# Patient Record
Sex: FEMALE | Race: WHITE | NOT HISPANIC OR LATINO | Employment: OTHER | URBAN - METROPOLITAN AREA
[De-identification: names, ages, dates, MRNs, and addresses within clinical notes are randomized per-mention and may not be internally consistent; named-entity substitution may affect disease eponyms.]

---

## 2017-01-16 ENCOUNTER — PATIENT MESSAGE (OUTPATIENT)
Dept: NEUROLOGY | Facility: CLINIC | Age: 82
End: 2017-01-16

## 2017-01-25 ENCOUNTER — PATIENT MESSAGE (OUTPATIENT)
Dept: INTERNAL MEDICINE | Facility: CLINIC | Age: 82
End: 2017-01-25

## 2017-01-25 DIAGNOSIS — G20.C PARKINSONISM, UNSPECIFIED PARKINSONISM TYPE: ICD-10-CM

## 2017-01-25 DIAGNOSIS — G20.A1 IDIOPATHIC PARKINSON'S DISEASE: Primary | ICD-10-CM

## 2017-01-26 NOTE — TELEPHONE ENCOUNTER
"Are you aware of some "lift mechanism" you're able to write orders for? Pt is having difficulties getting out of her chair at home and they were told there was a lift mechanism her PCP could order for her. Please advise.   "

## 2017-02-07 ENCOUNTER — PATIENT MESSAGE (OUTPATIENT)
Dept: NEUROLOGY | Facility: CLINIC | Age: 82
End: 2017-02-07

## 2017-02-14 ENCOUNTER — PATIENT MESSAGE (OUTPATIENT)
Dept: INTERNAL MEDICINE | Facility: CLINIC | Age: 82
End: 2017-02-14

## 2017-02-14 ENCOUNTER — OFFICE VISIT (OUTPATIENT)
Dept: INTERNAL MEDICINE | Facility: CLINIC | Age: 82
End: 2017-02-14
Payer: MEDICARE

## 2017-02-14 VITALS
BODY MASS INDEX: 22.35 KG/M2 | SYSTOLIC BLOOD PRESSURE: 138 MMHG | WEIGHT: 130.94 LBS | DIASTOLIC BLOOD PRESSURE: 72 MMHG | HEIGHT: 64 IN | HEART RATE: 68 BPM

## 2017-02-14 DIAGNOSIS — G20.C PARKINSONISM, UNSPECIFIED PARKINSONISM TYPE: ICD-10-CM

## 2017-02-14 DIAGNOSIS — I70.0 AORTIC ATHEROSCLEROSIS: ICD-10-CM

## 2017-02-14 DIAGNOSIS — G20.A1 IDIOPATHIC PARKINSON'S DISEASE: ICD-10-CM

## 2017-02-14 DIAGNOSIS — I10 ESSENTIAL HYPERTENSION: ICD-10-CM

## 2017-02-14 DIAGNOSIS — E78.5 DYSLIPIDEMIA: ICD-10-CM

## 2017-02-14 DIAGNOSIS — Z00.00 ROUTINE PHYSICAL EXAMINATION: Primary | ICD-10-CM

## 2017-02-14 PROCEDURE — 99499 UNLISTED E&M SERVICE: CPT | Mod: S$GLB,,, | Performed by: INTERNAL MEDICINE

## 2017-02-14 PROCEDURE — 3078F DIAST BP <80 MM HG: CPT | Mod: S$GLB,,, | Performed by: INTERNAL MEDICINE

## 2017-02-14 PROCEDURE — 99397 PER PM REEVAL EST PAT 65+ YR: CPT | Mod: S$GLB,,, | Performed by: INTERNAL MEDICINE

## 2017-02-14 PROCEDURE — 3075F SYST BP GE 130 - 139MM HG: CPT | Mod: S$GLB,,, | Performed by: INTERNAL MEDICINE

## 2017-02-14 PROCEDURE — 99999 PR PBB SHADOW E&M-EST. PATIENT-LVL III: CPT | Mod: PBBFAC,,, | Performed by: INTERNAL MEDICINE

## 2017-02-14 RX ORDER — TRIAMTERENE AND HYDROCHLOROTHIAZIDE 37.5; 25 MG/1; MG/1
1 CAPSULE ORAL DAILY
Qty: 90 CAPSULE | Refills: 3 | Status: SHIPPED | OUTPATIENT
Start: 2017-02-14 | End: 2017-12-04 | Stop reason: SDUPTHER

## 2017-02-14 RX ORDER — LOVASTATIN 40 MG/1
40 TABLET ORAL NIGHTLY
Qty: 90 TABLET | Refills: 3 | Status: SHIPPED | OUTPATIENT
Start: 2017-02-14 | End: 2017-12-04 | Stop reason: SDUPTHER

## 2017-02-14 RX ORDER — ASPIRIN 81 MG/1
81 TABLET ORAL DAILY
COMMUNITY

## 2017-02-14 RX ORDER — VERAPAMIL HYDROCHLORIDE 180 MG/1
180 TABLET, FILM COATED, EXTENDED RELEASE ORAL DAILY
Qty: 90 TABLET | Refills: 3 | Status: SHIPPED | OUTPATIENT
Start: 2017-02-14 | End: 2017-12-04 | Stop reason: SDUPTHER

## 2017-02-14 NOTE — MR AVS SNAPSHOT
[facility] - Internal Medicine  [address]  [facility] [address]  Phone: [phone]  Fax: [phone]                  Gisselle Oseguera   2017 1:30 PM   Office Visit    Description:  Female : 3/3/1931   Provider:  Philip Karimi MD   Department:  [facility] - Internal Medicine           Reason for Visit     Annual Exam           Diagnoses this Visit        Comments    Idiopathic Parkinson's disease    -  Primary     Parkinsonism, unspecified Parkinsonism type         Essential hypertension         Aortic atherosclerosis         Dyslipidemia                To Do List           Future Appointments        Provider Department Dept Phone    3/27/2017 2:00 PM Jayjay Quinones MD [facility] - Neurology [phone]      Goals (5 Years of Data)     None       These Medications        Disp Refills Start End    triamterene-hydrochlorothiazide 37.5-25 mg (DYAZIDE) 37.5-25 mg per capsule 90 capsule 3 2017     Take 1 capsule by mouth once daily. - Oral    Pharmacy: [facility] - [address] Ph #: [phone]       verapamil (CALAN-SR) 180 MG CR tablet 90 tablet 3 2017     Take 1 tablet (180 mg total) by mouth once daily. - Oral    Pharmacy: [facility] - [facility] [address] Ph #: [phone]       lovastatin (MEVACOR) 40 MG tablet 90 tablet 3 2017     Take 1 tablet (40 mg total) by mouth nightly. - Oral    Pharmacy: [facility] - [facility] [address] Ph #: [phone]         [facility] On Call     [facility] On Call Nurse Care Line -  Assistance  Registered nurses in the [facility] provide clinical advisement, health education, appointment booking, and other advisory services.  Call for this free service at [phone].             Medications           Message regarding Medications     Verify the changes and/or additions to your medication regime listed  below are the same as discussed with your clinician today.  If any of these changes or additions are incorrect, please notify your healthcare provider.        CHANGE how you are taking these medications     Start Taking Instead of    triamterene-hydrochlorothiazide 37.5-25 mg (DYAZIDE) 37.5-25 mg per capsule triamterene-hydrochlorothiazide 37.5-25 mg (DYAZIDE) 37.5-25 mg per capsule    Dosage:  Take 1 capsule by mouth once daily. Dosage:  TAKE 1 CAPSULE ONE TIME DAILY    Reason for Change:  Reorder     verapamil (CALAN-SR) 180 MG CR tablet verapamil (CALAN-SR) 180 MG CR tablet    Dosage:  Take 1 tablet (180 mg total) by mouth once daily. Dosage:  TAKE 1 TABLET EVERY DAY    Reason for Change:  Reorder     lovastatin (MEVACOR) 40 MG tablet lovastatin (MEVACOR) 40 MG tablet    Dosage:  Take 1 tablet (40 mg total) by mouth nightly. Dosage:  TAKE 1 TABLET EVERY NIGHT    Reason for Change:  Reorder       STOP taking these medications     trazodone (DESYREL) 50 MG tablet Take 1 tablet (50 mg total) by mouth every evening.    MULTIVITAMIN ORAL Take 1 tablet by mouth Daily.    calcium carbonate-vitamin D3 500mg (1,250mg) -600 unit Tab            Verify that the below list of medications is an accurate representation of the medications you are currently taking.  If none reported, the list may be blank. If incorrect, please contact your healthcare provider. Carry this list with you in case of emergency.           Current Medications     aspirin (ECOTRIN) 81 MG EC tablet Take 81 mg by mouth once daily.    calcium-vitamin D3 500 mg(1,250mg) -200 unit per tablet Take 1 tablet by mouth 2 (two) times daily with meals.    carbidopa-levodopa  mg (SINEMET)  mg per tablet Take 1 tab every 2 hours while awake.    citalopram (CELEXA) 10 MG tablet TAKE 1 TABLET ONE TIME DAILY    lovastatin (MEVACOR) 40 MG tablet Take 1 tablet (40 mg total) by mouth nightly.    multivitamin with minerals tablet      "triamterene-hydrochlorothiazide 37.5-25 mg (DYAZIDE) 37.5-25 mg per capsule Take 1 capsule by mouth once daily.    verapamil (CALAN-SR) 180 MG CR tablet Take 1 tablet (180 mg total) by mouth once daily.    vitamin A palmitate-vitamin D2 10,000-400 unit Tab            Clinical Reference Information           Your Vitals Were     BP Pulse Height Weight BMI    138/72 68 5' 4" (1.626 m) 59.4 kg (130 lb 15.3 oz) 22.48 kg/m2      Blood Pressure          Most Recent Value    BP  138/72      Allergies as of 2/14/2017     No Known Allergies      Immunizations Administered on Date of Encounter - 2/14/2017     None      Orders Placed During Today's Visit     Future Labs/Procedures Expected by Expires    CBC auto differential  2/14/2017 2/14/2018    Comprehensive metabolic panel  2/14/2017 2/14/2018    Lipid panel  2/14/2017 2/14/2018    TSH  2/14/2017 2/14/2018      Language Assistance Services     ATTENTION: Language assistance services are available, free of charge. Please call [phone].      ATENCIÓN: Si habla español, tiene a olmstead disposición servicios gratuitos de asistencia lingüística. Llame al [phone].     CONRAD Ý: N?u b?n nói Ti?ng Vi?t, có các d?ch v? h? tr? ngôn ng? mi?n phí dành cho b?n. G?i s? [phone].         [facility] complies with applicable Federal civil rights laws and does not discriminate on the basis of race, color, national origin, age, disability, or sex.        "

## 2017-02-15 ENCOUNTER — PATIENT MESSAGE (OUTPATIENT)
Dept: INTERNAL MEDICINE | Facility: CLINIC | Age: 82
End: 2017-02-15

## 2017-02-15 NOTE — PROGRESS NOTES
Subjective:       Patient ID: Gisselle Oseguera is a 85 y.o. female.    Chief Complaint: Annual Exam    HPI Comments: Patient attended by her  and daughter here for annual exam.  She has significant parkinsonism.  That seems to be stable.  Vision stable, appetite, urination and bowel movements are stable.  Patient denies any chest pain shortness of breath fevers or chills.  I would like to update some labs.  I will update prescription refills as requested.    Review of Systems   Constitutional: Negative for chills, fatigue, fever and unexpected weight change.   HENT: Negative for sore throat.    Eyes: Negative for pain and visual disturbance.   Respiratory: Negative for apnea, cough, chest tightness and shortness of breath.    Cardiovascular: Negative for chest pain and leg swelling.   Gastrointestinal: Negative for abdominal pain, constipation, diarrhea, nausea and vomiting.   Genitourinary: Negative for frequency, hematuria and menstrual problem.   Musculoskeletal: Positive for gait problem. Negative for arthralgias, joint swelling, neck pain and neck stiffness.   Skin: Negative for rash and wound.   Neurological: Positive for tremors and weakness. Negative for dizziness and headaches.   Hematological: Negative for adenopathy.   Psychiatric/Behavioral: Negative for dysphoric mood. The patient is not nervous/anxious.        Objective:      Physical Exam   Constitutional: She is oriented to person, place, and time. She appears well-developed and well-nourished.   HENT:   Head: Normocephalic and atraumatic.   Right Ear: Tympanic membrane, external ear and ear canal normal.   Left Ear: Tympanic membrane, external ear and ear canal normal.   Nose: Nose normal.   Mouth/Throat: Oropharynx is clear and moist and mucous membranes are normal. No oropharyngeal exudate.   Eyes: Conjunctivae and EOM are normal. Pupils are equal, round, and reactive to light. Right eye exhibits no discharge. Left eye exhibits no discharge.    Neck: Normal range of motion. Neck supple. No JVD present. No thyromegaly present.   Cardiovascular: Normal rate, regular rhythm, normal heart sounds and intact distal pulses.    No murmur heard.  Pulmonary/Chest: Effort normal and breath sounds normal. No respiratory distress. She has no wheezes. She has no rales.   Abdominal: Soft. Bowel sounds are normal. She exhibits no mass. There is no tenderness.   Musculoskeletal: Normal range of motion. She exhibits no edema or tenderness.   Lymphadenopathy:     She has no cervical adenopathy.        Right: No supraclavicular adenopathy present.        Left: No supraclavicular adenopathy present.   Neurological: She is alert and oriented to person, place, and time. She has normal reflexes. No cranial nerve deficit. Coordination abnormal.   Tremor both upper extremities   Skin: No rash noted.   Psychiatric: She has a normal mood and affect. She does not exhibit a depressed mood.       Assessment:       1. Routine physical examination    2. Idiopathic Parkinson's disease    3. Parkinsonism, unspecified Parkinsonism type    4. Essential hypertension    5. Aortic atherosclerosis    6. Dyslipidemia        Plan:       Gisselle was seen today for annual exam.    Diagnoses and all orders for this visit:    Routine physical examination    Idiopathic Parkinson's disease  -     CBC auto differential; Future  -     Comprehensive metabolic panel; Future  -     Lipid panel; Future  -     TSH; Future    Parkinsonism, unspecified Parkinsonism type  -     CBC auto differential; Future  -     Comprehensive metabolic panel; Future  -     Lipid panel; Future  -     TSH; Future    Essential hypertension  -     CBC auto differential; Future  -     Comprehensive metabolic panel; Future  -     Lipid panel; Future  -     TSH; Future    Aortic atherosclerosis  -     CBC auto differential; Future  -     Comprehensive metabolic panel; Future  -     Lipid panel; Future  -     TSH;  Future    Dyslipidemia  -     CBC auto differential; Future  -     Comprehensive metabolic panel; Future  -     Lipid panel; Future  -     TSH; Future    Other orders  -     triamterene-hydrochlorothiazide 37.5-25 mg (DYAZIDE) 37.5-25 mg per capsule; Take 1 capsule by mouth once daily.  -     verapamil (CALAN-SR) 180 MG CR tablet; Take 1 tablet (180 mg total) by mouth once daily.  -     lovastatin (MEVACOR) 40 MG tablet; Take 1 tablet (40 mg total) by mouth nightly.

## 2017-03-27 ENCOUNTER — OFFICE VISIT (OUTPATIENT)
Dept: NEUROLOGY | Facility: CLINIC | Age: 82
End: 2017-03-27
Payer: MEDICARE

## 2017-03-27 VITALS
HEIGHT: 64 IN | DIASTOLIC BLOOD PRESSURE: 83 MMHG | WEIGHT: 127.19 LBS | BODY MASS INDEX: 21.71 KG/M2 | SYSTOLIC BLOOD PRESSURE: 170 MMHG | HEART RATE: 76 BPM

## 2017-03-27 DIAGNOSIS — G20.A1 PARKINSON DISEASE: Primary | ICD-10-CM

## 2017-03-27 DIAGNOSIS — R44.3 HALLUCINATION: ICD-10-CM

## 2017-03-27 PROCEDURE — 1159F MED LIST DOCD IN RCRD: CPT | Mod: S$GLB,,, | Performed by: PSYCHIATRY & NEUROLOGY

## 2017-03-27 PROCEDURE — 1126F AMNT PAIN NOTED NONE PRSNT: CPT | Mod: S$GLB,,, | Performed by: PSYCHIATRY & NEUROLOGY

## 2017-03-27 PROCEDURE — 99214 OFFICE O/P EST MOD 30 MIN: CPT | Mod: S$GLB,,, | Performed by: PSYCHIATRY & NEUROLOGY

## 2017-03-27 PROCEDURE — 99499 UNLISTED E&M SERVICE: CPT | Mod: S$GLB,,, | Performed by: PSYCHIATRY & NEUROLOGY

## 2017-03-27 PROCEDURE — 1160F RVW MEDS BY RX/DR IN RCRD: CPT | Mod: S$GLB,,, | Performed by: PSYCHIATRY & NEUROLOGY

## 2017-03-27 PROCEDURE — 99999 PR PBB SHADOW E&M-EST. PATIENT-LVL III: CPT | Mod: PBBFAC,,, | Performed by: PSYCHIATRY & NEUROLOGY

## 2017-03-27 PROCEDURE — 1157F ADVNC CARE PLAN IN RCRD: CPT | Mod: S$GLB,,, | Performed by: PSYCHIATRY & NEUROLOGY

## 2017-03-27 NOTE — PROGRESS NOTES
"Name: Gisselle Oseguera  MRN: [id number]   CSN: [id number]      Date: 03/27/2017    Chief Complaint:   1) Never tried the sleep proposal below (melatonin in PM and coffee in AM) - will do now  2) Gait is stable  3) Hallucinations and delusions still exist    History of Present Illness (HPI):  Tremor dominant PD x 10+ years, L>R, started on Requip.  Then added Sinemet.  Did well for a long time.  Now having more issues with tremor breaking through.  Amantadine caused confusion and dizzy/fainting.  Not sure about Axilect.    "Fights constipation", no hyposmia, sleeps well at night (rare dreams, no RBD), no depression or anxiety - but started Lexapro to "take the edge off".  Doesn't think the tremors got worse.    ROS:  Review of Systems   Constitutional: Negative for malaise/fatigue and weight loss.   HENT: Negative for hearing loss.    Eyes: Negative for blurred vision and double vision.   Respiratory: Negative for shortness of breath and stridor.    Cardiovascular: Negative for chest pain and palpitations.   Gastrointestinal: Positive for constipation. Negative for nausea and vomiting.   Genitourinary: Negative for frequency and urgency.   Musculoskeletal: Negative for falls and myalgias.   Skin: Negative for rash.   Neurological: Positive for tremors. Negative for focal weakness and seizures.   Endo/Heme/Allergies: Does not bruise/bleed easily.   Psychiatric/Behavioral: Negative for depression, hallucinations and memory loss. The patient is not nervous/anxious.      Past Medical History: The patient  has a past medical history of Arthritis; Basal cell carcinoma; Cataract; Depression; Fever blister; High cholesterol; HTN (hypertension) (6/27/2013); Hypertension; Memory loss; and Parkinsonism.    Social History: The patient  reports that she quit smoking about 48 years ago. She does not have any smokeless tobacco history on file. She reports that she drinks alcohol. She reports that she does not use illicit drugs.    Family " "History: Their family history includes Cancer (age of onset: 80) in her mother; Macular degeneration in her paternal aunt; No Known Problems in her brother, father, maternal aunt, maternal grandfather, maternal grandmother, maternal uncle, paternal grandfather, paternal grandmother, paternal uncle, and sister. There is no history of Melanoma, Blindness, Glaucoma, Retinal detachment, Amblyopia, Cataracts, Diabetes, Hypertension, Strabismus, Stroke, or Thyroid disease.    Allergies: Review of patient's allergies indicates no known allergies.     Meds:   Current Outpatient Prescriptions on File Prior to Visit   Medication Sig Dispense Refill    aspirin (ECOTRIN) 81 MG EC tablet Take 81 mg by mouth once daily.      calcium-vitamin D3 500 mg(1,250mg) -200 unit per tablet Take 1 tablet by mouth 2 (two) times daily with meals.      carbidopa-levodopa  mg (SINEMET)  mg per tablet Take 1 tab every 2 hours while awake. 810 tablet 3    citalopram (CELEXA) 10 MG tablet TAKE 1 TABLET ONE TIME DAILY 90 tablet 3    lovastatin (MEVACOR) 40 MG tablet Take 1 tablet (40 mg total) by mouth nightly. 90 tablet 3    multivitamin with minerals tablet       triamterene-hydrochlorothiazide 37.5-25 mg (DYAZIDE) 37.5-25 mg per capsule Take 1 capsule by mouth once daily. 90 capsule 3    verapamil (CALAN-SR) 180 MG CR tablet Take 1 tablet (180 mg total) by mouth once daily. 90 tablet 3    vitamin A palmitate-vitamin D2 10,000-400 unit Tab        No current facility-administered medications on file prior to visit.        Exam:  BP (!) 170/83 (BP Location: Left arm, Patient Position: Sitting, BP Method: Automatic)  Pulse 76  Ht 5' 4" (1.626 m)  Wt 57.7 kg (127 lb 3.3 oz)  BMI 21.83 kg/m2    * Specialized movement exam  Hypophonic speech.    Moderate facial masking.   L>R moderate CW and mild rigidity.   L>>R resting tremor, some re-emergence with posture, + chin tremor.   Mild choreic dyskinesia only, no dystonia.   Gait " is stooped, limited arm swing, mild postural instability.      Laboratory/Radiological:  - Results: none to review    - Independent review of images: none to review    Diagnoses:          1) PD - tremor dominant.   2) Parkinson's induced psychosis  3) Sleep dyscrasia - circ rhthym disorder    Medical Decision Makin) Melatonin 1 - 5 mg in the evening, about 2-3 hours before bedtime  2) 1-2 cups of coffee in the morning (or even at noon) is just fine, good for the brain and helps keep you alert.  3) Nuplazid trial - will order now.      Jayjay Quinones MD, MPH  Division of Movement and Memory Disorders  [facility]  [phone]

## 2017-03-27 NOTE — LETTER
March 28, 2017      Philip Karimi MD  [address]  [facility] [address]           [facility]  [address]  [facility] [address]  Phone: [phone]  Fax: [phone]          Patient: Gisselle Oseguera   MR Number: [id number]   YOB: 1931   Date of Visit: 3/27/2017       Dear Dr. Philip Karimi:    Thank you for referring Gisselle Oseguera to me for evaluation. Attached you will find relevant portions of my assessment and plan of care.    If you have questions, please do not hesitate to call me. I look forward to following Gisselle Oseguera along with you.    Sincerely,    Jayjay Quinones MD    Enclosure  CC:  No Recipients    If you would like to receive this communication electronically, please contact [email or web address] or [phone] to request more information on [employer] Link access.    For providers and/or their staff who would like to refer a patient to [facility], please contact us through our one-stop-shop provider referral line, [facility], at [phone].    If you feel you have received this communication in error or would no longer like to receive these types of communications, please e-mail [email or web address]

## 2017-03-28 RX ORDER — CARBIDOPA AND LEVODOPA 25; 100 MG/1; MG/1
TABLET ORAL
Qty: 1260 TABLET | Refills: 3 | Status: SHIPPED | OUTPATIENT
Start: 2017-03-28 | End: 2017-10-19 | Stop reason: SDUPTHER

## 2017-04-07 ENCOUNTER — TELEPHONE (OUTPATIENT)
Dept: NEUROLOGY | Facility: CLINIC | Age: 82
End: 2017-04-07

## 2017-04-07 NOTE — TELEPHONE ENCOUNTER
Nuplazid. Took one dose on Thursday 4/6/2017. Read side effects and having mixed feelings about patient taking medication. Patient has not experienced any side effects as of yet; patient said she did not feel any different.  Has some general concerns about the medication and would like a call from Dr. Quinones.

## 2017-04-07 NOTE — TELEPHONE ENCOUNTER
----- Message from Zora Rogers sent at 4/7/2017  4:01 PM CDT -----  Contact: Steven   [phone]  Steven is requesting a call back from Dr. Quinones in regards to some medication for his wife.

## 2017-04-11 ENCOUNTER — TELEPHONE (OUTPATIENT)
Dept: NEUROLOGY | Facility: CLINIC | Age: 82
End: 2017-04-11

## 2017-04-11 NOTE — TELEPHONE ENCOUNTER
----- Message from Roseline Abad sent at 4/7/2017  2:48 PM CDT -----  Contact: sarah (daughter) @ [phone] or [phone]  pts daughter would like to speak with someone concerning pts medication.  pls call.

## 2017-07-24 ENCOUNTER — OFFICE VISIT (OUTPATIENT)
Dept: INTERNAL MEDICINE | Facility: CLINIC | Age: 82
End: 2017-07-24
Payer: MEDICARE

## 2017-07-24 VITALS
TEMPERATURE: 98 F | SYSTOLIC BLOOD PRESSURE: 144 MMHG | HEART RATE: 68 BPM | DIASTOLIC BLOOD PRESSURE: 80 MMHG | HEIGHT: 63 IN | BODY MASS INDEX: 22.15 KG/M2 | OXYGEN SATURATION: 94 % | WEIGHT: 125 LBS

## 2017-07-24 DIAGNOSIS — L03.116 CELLULITIS OF LEFT LOWER EXTREMITY: ICD-10-CM
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
## 2017-07-24 PROCEDURE — 99999 PR PBB SHADOW E&M-EST. PATIENT-LVL IV: CPT | Mod: PBBFAC,,, | Performed by: NURSE PRACTITIONER

## 2017-07-24 PROCEDURE — 99213 OFFICE O/P EST LOW 20 MIN: CPT | Mod: S$GLB,,, | Performed by: NURSE PRACTITIONER

## 2017-07-24 PROCEDURE — 1159F MED LIST DOCD IN RCRD: CPT | Mod: S$GLB,,, | Performed by: NURSE PRACTITIONER

## 2017-07-24 PROCEDURE — 1125F AMNT PAIN NOTED PAIN PRSNT: CPT | Mod: S$GLB,,, | Performed by: NURSE PRACTITIONER

## 2017-07-24 RX ORDER — SULFAMETHOXAZOLE AND TRIMETHOPRIM 800; 160 MG/1; MG/1
1 TABLET ORAL 2 TIMES DAILY
Qty: 14 TABLET | Refills: 0 | Status: SHIPPED | OUTPATIENT
Start: 2017-07-24 | End: 2018-02-23

## 2017-07-24 NOTE — PROGRESS NOTES
Subjective:       Patient ID: Gisselle Oseguera is a 86 y.o. female.    Chief Complaint: Fall and Leg Pain    Ms. Oseguera fell in [place] last weekend.  She developed a small bump on her left shin. Since then the area has become more red and tender.       Fall   The accident occurred more than 1 week ago. The fall occurred while standing. She landed on hard floor. The pain is present in the left lower leg. The pain is at a severity of 2/10. The pain is mild. The symptoms are aggravated by pressure on injury. Pertinent negatives include no headaches, hematuria or vomiting.   Leg Pain        Review of Systems   Constitutional: Negative for activity change and unexpected weight change.   HENT: Negative for hearing loss, rhinorrhea and trouble swallowing.    Eyes: Negative for discharge and visual disturbance.   Respiratory: Negative for chest tightness and wheezing.    Cardiovascular: Negative for chest pain and palpitations.   Gastrointestinal: Negative for blood in stool, constipation, diarrhea and vomiting.   Endocrine: Negative for polydipsia and polyuria.   Genitourinary: Negative for difficulty urinating, dysuria, hematuria and menstrual problem.   Musculoskeletal: Positive for arthralgias. Negative for joint swelling and neck pain.   Neurological: Negative for weakness and headaches.   Psychiatric/Behavioral: Negative for confusion and dysphoric mood.       Objective:      Physical Exam   Constitutional: She is oriented to person, place, and time. No distress.   HENT:   Head: Atraumatic.   Eyes: No scleral icterus.   Cardiovascular: Normal rate, regular rhythm and normal heart sounds.    Pulmonary/Chest: Effort normal and breath sounds normal. No respiratory distress. She has no wheezes. She has no rales.   Neurological: She is alert and oriented to person, place, and time. She displays tremor.   Skin: She is not diaphoretic.        Psychiatric: She has a normal mood and affect.       Assessment:       1. Fall,  initial encounter    2. Cellulitis of left lower extremity        Plan:   1. Fall, initial encounter    2. Cellulitis of left lower extremity  - sulfamethoxazole-trimethoprim 800-160mg (BACTRIM DS) 800-160 mg Tab; Take 1 tablet by mouth 2 (two) times daily.  Dispense: 14 tablet; Refill: 0      Pt has been given instructions populated from [employer] database and has verbalized understanding of the after visit summary and information contained wherein.    Follow up with a primary care provider. May go to ER for acute shortness of breath, lightheadedness, fever, or any other emergent complaints or changes in condition.

## 2017-08-24 ENCOUNTER — PATIENT MESSAGE (OUTPATIENT)
Dept: NEUROLOGY | Facility: CLINIC | Age: 82
End: 2017-08-24

## 2017-10-09 ENCOUNTER — OFFICE VISIT (OUTPATIENT)
Dept: NEUROLOGY | Facility: CLINIC | Age: 82
End: 2017-10-09
Payer: MEDICARE

## 2017-10-09 VITALS
HEART RATE: 66 BPM | DIASTOLIC BLOOD PRESSURE: 72 MMHG | WEIGHT: 128.5 LBS | BODY MASS INDEX: 22.77 KG/M2 | HEIGHT: 63 IN | SYSTOLIC BLOOD PRESSURE: 128 MMHG

## 2017-10-09 DIAGNOSIS — G20.A1 PARKINSON DISEASE: Primary | ICD-10-CM

## 2017-10-09 PROCEDURE — 99213 OFFICE O/P EST LOW 20 MIN: CPT | Mod: S$GLB,,, | Performed by: PSYCHIATRY & NEUROLOGY

## 2017-10-09 PROCEDURE — 99999 PR PBB SHADOW E&M-EST. PATIENT-LVL III: CPT | Mod: PBBFAC,,, | Performed by: PSYCHIATRY & NEUROLOGY

## 2017-10-09 PROCEDURE — 99499 UNLISTED E&M SERVICE: CPT | Mod: S$GLB,,, | Performed by: PSYCHIATRY & NEUROLOGY

## 2017-10-09 RX ORDER — ASCORBIC ACID 500 MG
500 TABLET ORAL DAILY
COMMUNITY

## 2017-10-19 NOTE — TELEPHONE ENCOUNTER
----- Message from Paddy Peck sent at 10/19/2017  1:27 PM CDT -----  Contact: Lizandro (daughter) @ [phone]  Lizandro is calling for refill on carbidopa-levodopa  mg (SINEMET)  mg per tablet    [facility] - [place], [place] - [address]  [address] [address]  Phone: [phone] Fax: [phone]

## 2017-10-23 RX ORDER — CARBIDOPA AND LEVODOPA 25; 100 MG/1; MG/1
TABLET ORAL
Qty: 1260 TABLET | Refills: 3 | Status: SHIPPED | OUTPATIENT
Start: 2017-10-23 | End: 2018-05-07 | Stop reason: SDUPTHER

## 2017-11-29 NOTE — TELEPHONE ENCOUNTER
----- Message from Bari Kiran sent at 11/29/2017  3:33 PM CST -----  Contact: Patient @ [phone]  Patient needs a refill on ( citalopram (CELEXA) 10 MG tablet ) pt is low on medication     [facility] - [place], [place] - [address]  [address] [address]  Phone: [phone] Fax: [phone]

## 2017-12-04 ENCOUNTER — TELEPHONE (OUTPATIENT)
Dept: INTERNAL MEDICINE | Facility: CLINIC | Age: 82
End: 2017-12-04

## 2017-12-04 ENCOUNTER — TELEPHONE (OUTPATIENT)
Dept: NEUROLOGY | Facility: CLINIC | Age: 82
End: 2017-12-04

## 2017-12-04 RX ORDER — VERAPAMIL HYDROCHLORIDE 180 MG/1
TABLET, FILM COATED, EXTENDED RELEASE ORAL
Qty: 90 TABLET | Refills: 3 | Status: SHIPPED | OUTPATIENT
Start: 2017-12-04 | End: 2018-02-23 | Stop reason: SDUPTHER

## 2017-12-04 RX ORDER — CITALOPRAM 10 MG/1
TABLET ORAL
Qty: 90 TABLET | Refills: 3 | Status: SHIPPED | OUTPATIENT
Start: 2017-12-04 | End: 2019-01-02 | Stop reason: SDUPTHER

## 2017-12-04 RX ORDER — LOVASTATIN 40 MG/1
TABLET ORAL
Qty: 90 TABLET | Refills: 3 | Status: SHIPPED | OUTPATIENT
Start: 2017-12-04 | End: 2018-02-23

## 2017-12-04 RX ORDER — TRIAMTERENE AND HYDROCHLOROTHIAZIDE 37.5; 25 MG/1; MG/1
CAPSULE ORAL
Qty: 90 CAPSULE | Refills: 3 | Status: SHIPPED | OUTPATIENT
Start: 2017-12-04 | End: 2019-03-06 | Stop reason: SDUPTHER

## 2017-12-04 RX ORDER — CITALOPRAM 10 MG/1
TABLET ORAL
Qty: 90 TABLET | Refills: 3 | Status: SHIPPED | OUTPATIENT
Start: 2017-12-04 | End: 2017-12-04 | Stop reason: SDUPTHER

## 2017-12-04 RX ORDER — CITALOPRAM 10 MG/1
TABLET ORAL
Qty: 90 TABLET | Refills: 3 | OUTPATIENT
Start: 2017-12-04

## 2017-12-04 NOTE — TELEPHONE ENCOUNTER
----- Message from Roseline Abad sent at 12/4/2017  1:24 PM CST -----  Contact: Lizandro (daughter) @ [phone]  Pts daughter says she has questions concerning pts new parkinson's / dementia medication.  She does not know the name of the medication but says it is an experimental medication.  Pt no longer wants to take the medication.  Pts daughter would like to know if she can just stop taking it of if she will need to wean off of it.  Pls call to discuss.

## 2017-12-05 NOTE — TELEPHONE ENCOUNTER
----- Message from Zack Bishop sent at 12/5/2017  1:01 PM CST -----  Contact: Daughter Lizandro  States she would like a call regarding patient medication, states patient does not want to continue it anymore.    nuplazid medication     Call  or

## 2017-12-05 NOTE — TELEPHONE ENCOUNTER
Called patient daughter and she mentioned that Ms. Pitts doesn't like the medication Nuplazid. She stated that her mother feel as though its not working for her, and she would like to stop taking the medication. Pt daughter would like a call from the provider regarding the medication starter pack Nuplazid.     Lizandro [phone]

## 2017-12-06 ENCOUNTER — TELEPHONE (OUTPATIENT)
Dept: NEUROLOGY | Facility: CLINIC | Age: 82
End: 2017-12-06

## 2017-12-06 NOTE — TELEPHONE ENCOUNTER
----- Message from Reina Davidson sent at 12/6/2017  9:27 AM CST -----  Contact: Pt daughter Monet Healy says she needs to know if pt can stop this medication     Says she spoke with someone and was told they would get back with her and no one called her back with an answer    Monet can be reahced at [phone] or [phone]    Thanks

## 2017-12-27 ENCOUNTER — TELEPHONE (OUTPATIENT)
Dept: NEUROLOGY | Facility: CLINIC | Age: 82
End: 2017-12-27

## 2017-12-27 NOTE — TELEPHONE ENCOUNTER
Spoke with pt's daughter, Kaley, and , Steven, in regards to r/s the 1/15/18 appt with Dr. Quinones due to provider being out office. Kaley and Steven have accepted an appt on behalf of pt for 1/5/18 @ 1:00pm. Kaley will contact other sister, Lizandro, to confirm the date and availability to transport. I advised to call us back if the new date/time do not work. New appt letter to be mailed.

## 2018-01-05 ENCOUNTER — OFFICE VISIT (OUTPATIENT)
Dept: NEUROLOGY | Facility: CLINIC | Age: 83
End: 2018-01-05
Payer: MEDICARE

## 2018-01-05 VITALS
WEIGHT: 128.31 LBS | HEART RATE: 63 BPM | HEIGHT: 63 IN | DIASTOLIC BLOOD PRESSURE: 71 MMHG | SYSTOLIC BLOOD PRESSURE: 127 MMHG | BODY MASS INDEX: 22.73 KG/M2

## 2018-01-05 DIAGNOSIS — G20.C PARKINSONISM, UNSPECIFIED PARKINSONISM TYPE: Primary | ICD-10-CM

## 2018-01-05 PROCEDURE — 99999 PR PBB SHADOW E&M-EST. PATIENT-LVL III: CPT | Mod: PBBFAC,,, | Performed by: PSYCHIATRY & NEUROLOGY

## 2018-01-05 PROCEDURE — 99499 UNLISTED E&M SERVICE: CPT | Mod: S$GLB,,, | Performed by: PSYCHIATRY & NEUROLOGY

## 2018-01-05 PROCEDURE — 99214 OFFICE O/P EST MOD 30 MIN: CPT | Mod: S$GLB,,, | Performed by: PSYCHIATRY & NEUROLOGY

## 2018-01-05 RX ORDER — DONEPEZIL HYDROCHLORIDE 10 MG/1
10 TABLET, FILM COATED ORAL NIGHTLY
Qty: 30 TABLET | Refills: 11 | Status: SHIPPED | OUTPATIENT
Start: 2018-01-05 | End: 2019-07-08 | Stop reason: SDUPTHER

## 2018-01-05 RX ORDER — DONEPEZIL HYDROCHLORIDE 10 MG/1
10 TABLET, FILM COATED ORAL NIGHTLY
Qty: 90 TABLET | Refills: 3 | Status: SHIPPED | OUTPATIENT
Start: 2018-01-26 | End: 2018-07-30

## 2018-01-05 NOTE — PROGRESS NOTES
"Name: Gisselle Oseguera  MRN: [id number]   CSN: [id number]      Date: 01/05/2018    Chief Complaint:   - still some hallucinations and no benefit of nuplazid  - memory likely a bit worse  - gait unstable     From October 2017:  - worried about nuplazid  - some persistent hallucniations  - memory is holding up now  - no falls but risk  - bowels stable  - bp under control    From March 2017  1) Never tried the sleep proposal below (melatonin in PM and coffee in AM) - will do now  2) Gait is stable  3) Hallucinations and delusions still exist    History of Present Illness (HPI):  Tremor dominant PD x 10+ years, L>R, started on Requip.  Then added Sinemet.  Did well for a long time.  Now having more issues with tremor breaking through.  Amantadine caused confusion and dizzy/fainting.  Not sure about Axilect.    "Fights constipation", no hyposmia, sleeps well at night (rare dreams, no RBD), no depression or anxiety - but started Lexapro to "take the edge off".  Doesn't think the tremors got worse.    ROS:  Review of Systems   Constitutional: Negative for malaise/fatigue and weight loss.   HENT: Negative for hearing loss.    Eyes: Negative for blurred vision and double vision.   Respiratory: Negative for shortness of breath and stridor.    Cardiovascular: Negative for chest pain and palpitations.   Gastrointestinal: Positive for constipation. Negative for nausea and vomiting.   Genitourinary: Negative for frequency and urgency.   Musculoskeletal: Negative for falls and myalgias.   Skin: Negative for rash.   Neurological: Positive for tremors. Negative for focal weakness and seizures.   Endo/Heme/Allergies: Does not bruise/bleed easily.   Psychiatric/Behavioral: Negative for depression, hallucinations and memory loss. The patient is not nervous/anxious.      Past Medical History: The patient  has a past medical history of Arthritis; Basal cell carcinoma; Cataract; Depression; Fever blister; High cholesterol; HTN (hypertension) " "(6/27/2013); Hypertension; Memory loss; and Parkinsonism.    Social History: The patient  reports that she quit smoking about 49 years ago. She has never used smokeless tobacco. She reports that she drinks alcohol. She reports that she does not use drugs.    Family History: Their family history includes Cancer (age of onset: 80) in her mother; Macular degeneration in her paternal aunt; No Known Problems in her brother, father, maternal aunt, maternal grandfather, maternal grandmother, maternal uncle, paternal grandfather, paternal grandmother, paternal uncle, and sister.    Allergies: Patient has no known allergies.     Meds:   Current Outpatient Prescriptions on File Prior to Visit   Medication Sig Dispense Refill    ascorbic acid, vitamin C, (VITAMIN C) 500 MG tablet Take 500 mg by mouth once daily.      aspirin (ECOTRIN) 81 MG EC tablet Take 81 mg by mouth once daily.      calcium-vitamin D3 500 mg(1,250mg) -200 unit per tablet Take 1 tablet by mouth 2 (two) times daily with meals.      carbidopa-levodopa  mg (SINEMET)  mg per tablet Take 1 tab every 2 hours while awake. 1260 tablet 3    citalopram (CELEXA) 10 MG tablet TAKE 1 TABLET ONE TIME DAILY 90 tablet 3    DOCUSATE CALCIUM (STOOL SOFTENER ORAL)       lovastatin (MEVACOR) 40 MG tablet TAKE 1 TABLET EVERY NIGHT 90 tablet 3    multivitamin with minerals tablet       triamterene-hydrochlorothiazide 37.5-25 mg (DYAZIDE) 37.5-25 mg per capsule TAKE 1 CAPSULE EVERY DAY 90 capsule 3    verapamil (CALAN-SR) 180 MG CR tablet TAKE 1 TABLET ONE TIME DAILY 90 tablet 3    vitamin A palmitate-vitamin D2 10,000-400 unit Tab       sulfamethoxazole-trimethoprim 800-160mg (BACTRIM DS) 800-160 mg Tab Take 1 tablet by mouth 2 (two) times daily. 14 tablet 0     No current facility-administered medications on file prior to visit.        Exam:  /71   Pulse 63   Ht 5' 3" (1.6 m)   Wt 58.2 kg (128 lb 4.9 oz)   BMI 22.73 kg/m²     * Specialized " movement exam  Hypophonic speech.    Moderate facial masking.   L>R moderate CW and mild rigidity.   L>>R resting tremor, some re-emergence with posture, + chin tremor.   Mild choreic dyskinesia only, no dystonia.   Gait is stooped, limited arm swing, mild postural instability.      Laboratory/Radiological:  - Results: none to review    - Independent review of images: none to review    Diagnoses:          1) PD - tremor dominant.   2) Parkinson's induced psychosis  3) Sleep dyscrasia - circ rhthym disorder    Medical Decision Making:  - d/c nuplazid  - adding donepezil for hallucinations, memory and gait  -    Jayjay Quinones MD, MPH  Division of Movement and Memory Disorders  [facility]  [phone]

## 2018-02-23 ENCOUNTER — OFFICE VISIT (OUTPATIENT)
Dept: INTERNAL MEDICINE | Facility: CLINIC | Age: 83
End: 2018-02-23
Payer: MEDICARE

## 2018-02-23 ENCOUNTER — LAB VISIT (OUTPATIENT)
Dept: LAB | Facility: HOSPITAL | Age: 83
End: 2018-02-23
Attending: INTERNAL MEDICINE
Payer: MEDICARE

## 2018-02-23 VITALS
WEIGHT: 124.31 LBS | HEART RATE: 72 BPM | OXYGEN SATURATION: 97 % | SYSTOLIC BLOOD PRESSURE: 130 MMHG | HEIGHT: 61 IN | BODY MASS INDEX: 23.47 KG/M2 | DIASTOLIC BLOOD PRESSURE: 78 MMHG

## 2018-02-23 DIAGNOSIS — Z00.00 ROUTINE PHYSICAL EXAMINATION: ICD-10-CM

## 2018-02-23 DIAGNOSIS — Z78.0 POSTMENOPAUSAL: ICD-10-CM

## 2018-02-23 DIAGNOSIS — G20.A1 IDIOPATHIC PARKINSON'S DISEASE: ICD-10-CM

## 2018-02-23 DIAGNOSIS — Z00.00 ROUTINE PHYSICAL EXAMINATION: Primary | ICD-10-CM

## 2018-02-23 DIAGNOSIS — M85.80 OSTEOPENIA, UNSPECIFIED LOCATION: ICD-10-CM

## 2018-02-23 DIAGNOSIS — I10 ESSENTIAL HYPERTENSION: ICD-10-CM

## 2018-02-23 DIAGNOSIS — R41.3 MEMORY LOSS: ICD-10-CM

## 2018-02-23 DIAGNOSIS — I70.0 AORTIC ATHEROSCLEROSIS: ICD-10-CM

## 2018-02-23 LAB
ALBUMIN SERPL BCP-MCNC: 3.4 G/DL
ALP SERPL-CCNC: 63 U/L
ALT SERPL W/O P-5'-P-CCNC: <5 U/L
ANION GAP SERPL CALC-SCNC: 9 MMOL/L
AST SERPL-CCNC: 14 U/L
BASOPHILS # BLD AUTO: 0.02 K/UL
BASOPHILS NFR BLD: 0.3 %
BILIRUB SERPL-MCNC: 0.3 MG/DL
BUN SERPL-MCNC: 20 MG/DL
CALCIUM SERPL-MCNC: 9.5 MG/DL
CHLORIDE SERPL-SCNC: 104 MMOL/L
CHOLEST SERPL-MCNC: 185 MG/DL
CHOLEST/HDLC SERPL: 2.7 {RATIO}
CO2 SERPL-SCNC: 27 MMOL/L
CREAT SERPL-MCNC: 0.8 MG/DL
DIFFERENTIAL METHOD: ABNORMAL
EOSINOPHIL # BLD AUTO: 0.3 K/UL
EOSINOPHIL NFR BLD: 4.8 %
ERYTHROCYTE [DISTWIDTH] IN BLOOD BY AUTOMATED COUNT: 13.9 %
EST. GFR  (AFRICAN AMERICAN): >60 ML/MIN/1.73 M^2
EST. GFR  (NON AFRICAN AMERICAN): >60 ML/MIN/1.73 M^2
GLUCOSE SERPL-MCNC: 87 MG/DL
HCT VFR BLD AUTO: 36.9 %
HDLC SERPL-MCNC: 68 MG/DL
HDLC SERPL: 36.8 %
HGB BLD-MCNC: 12.5 G/DL
LDLC SERPL CALC-MCNC: 98.6 MG/DL
LYMPHOCYTES # BLD AUTO: 1.7 K/UL
LYMPHOCYTES NFR BLD: 24.2 %
MCH RBC QN AUTO: 31.5 PG
MCHC RBC AUTO-ENTMCNC: 33.9 G/DL
MCV RBC AUTO: 93 FL
MONOCYTES # BLD AUTO: 0.6 K/UL
MONOCYTES NFR BLD: 9.1 %
NEUTROPHILS # BLD AUTO: 4.3 K/UL
NEUTROPHILS NFR BLD: 61.5 %
NONHDLC SERPL-MCNC: 117 MG/DL
PLATELET # BLD AUTO: 243 K/UL
PMV BLD AUTO: 9.8 FL
POTASSIUM SERPL-SCNC: 4.3 MMOL/L
PROT SERPL-MCNC: 6.9 G/DL
RBC # BLD AUTO: 3.97 M/UL
SODIUM SERPL-SCNC: 140 MMOL/L
TRIGL SERPL-MCNC: 92 MG/DL
WBC # BLD AUTO: 6.91 K/UL

## 2018-02-23 PROCEDURE — 99999 PR PBB SHADOW E&M-EST. PATIENT-LVL IV: CPT | Mod: PBBFAC,,, | Performed by: INTERNAL MEDICINE

## 2018-02-23 PROCEDURE — 99499 UNLISTED E&M SERVICE: CPT | Mod: S$GLB,,, | Performed by: INTERNAL MEDICINE

## 2018-02-23 PROCEDURE — 80053 COMPREHEN METABOLIC PANEL: CPT

## 2018-02-23 PROCEDURE — 99397 PER PM REEVAL EST PAT 65+ YR: CPT | Mod: S$GLB,,, | Performed by: INTERNAL MEDICINE

## 2018-02-23 PROCEDURE — 85025 COMPLETE CBC W/AUTO DIFF WBC: CPT

## 2018-02-23 PROCEDURE — 36415 COLL VENOUS BLD VENIPUNCTURE: CPT

## 2018-02-23 PROCEDURE — 80061 LIPID PANEL: CPT

## 2018-02-23 RX ORDER — VERAPAMIL HYDROCHLORIDE 180 MG/1
TABLET, FILM COATED, EXTENDED RELEASE ORAL
Qty: 90 TABLET | Refills: 3 | Status: SHIPPED | OUTPATIENT
Start: 2018-02-23 | End: 2019-03-06 | Stop reason: SDUPTHER

## 2018-02-23 RX ORDER — PRAVASTATIN SODIUM 40 MG/1
40 TABLET ORAL DAILY
Qty: 90 TABLET | Refills: 4 | Status: SHIPPED | OUTPATIENT
Start: 2018-02-23 | End: 2019-03-06 | Stop reason: SDUPTHER

## 2018-02-23 NOTE — PROGRESS NOTES
Subjective:       Patient ID: Gisselle Oseguera is a 86 y.o. female.    Chief Complaint: Annual Exam     Patient with parkinsonism followed neurology, comes in annual exam.  She is attended by her  with whom she lives and her 2 sons.  She is extremely will care for.  She feels well and other than the tremor and gait abnormality she has no other significant complaints.  Vision has been borderline but she has seen Ophthalmology.  We updated some prescriptions.  We will update some labs.  Appetite is fair.  She says she sometimes gets constipated      Review of Systems   Constitutional: Negative for chills, fatigue, fever and unexpected weight change.   HENT: Negative for nosebleeds, sinus pain and sore throat.    Eyes: Negative for pain and visual disturbance.   Respiratory: Negative for apnea, cough, chest tightness and shortness of breath.    Cardiovascular: Negative for chest pain and leg swelling.   Gastrointestinal: Negative for abdominal pain, constipation, diarrhea, nausea and vomiting.   Genitourinary: Negative for frequency, hematuria and menstrual problem.   Musculoskeletal: Positive for arthralgias and gait problem. Negative for joint swelling, neck pain and neck stiffness.   Skin: Negative for rash and wound.   Neurological: Positive for tremors. Negative for dizziness and headaches.   Hematological: Negative for adenopathy.   Psychiatric/Behavioral: Positive for decreased concentration. Negative for dysphoric mood. The patient is not nervous/anxious.        Objective:      Physical Exam   Constitutional: She is oriented to person, place, and time. She appears well-developed and well-nourished. No distress.   HENT:   Head: Normocephalic and atraumatic.   Right Ear: External ear normal.   Left Ear: External ear normal.   Mouth/Throat: Oropharynx is clear and moist. No oropharyngeal exudate.   Eyes: Conjunctivae are normal. Pupils are equal, round, and reactive to light. No scleral icterus.   Neck:  Normal range of motion. Neck supple. No thyromegaly present.   Cardiovascular: Normal rate and regular rhythm.    No murmur heard.  Pulmonary/Chest: Effort normal and breath sounds normal. She has no wheezes.   Abdominal: Soft. Bowel sounds are normal. She exhibits no distension. There is no tenderness.   Musculoskeletal: She exhibits no tenderness.   Lymphadenopathy:     She has no cervical adenopathy.   Neurological: She is alert and oriented to person, place, and time.   Tremor of the hands, slow gait although was able to get on the exam table with the help of myself and her sons.  Answers questions appropriately but slow response time   Skin: No rash noted.   Psychiatric: She has a normal mood and affect.       Assessment:       1. Routine physical examination    2. Idiopathic Parkinson's disease    3. Memory loss    4. Essential hypertension    5. Aortic atherosclerosis    6. Osteopenia, unspecified location    7. Postmenopausal        Plan:       Gisselle was seen today for annual exam.    Diagnoses and all orders for this visit:    Routine physical examination  -     CBC auto differential; Future  -     Comprehensive metabolic panel; Future  -     Lipid panel; Future    Idiopathic Parkinson's disease  -     CBC auto differential; Future  -     Comprehensive metabolic panel; Future  -     Lipid panel; Future    Memory loss  -     CBC auto differential; Future  -     Comprehensive metabolic panel; Future  -     Lipid panel; Future    Essential hypertension  -     CBC auto differential; Future  -     Comprehensive metabolic panel; Future  -     Lipid panel; Future    Aortic atherosclerosis  -     CBC auto differential; Future  -     Comprehensive metabolic panel; Future  -     Lipid panel; Future    Osteopenia, unspecified location  -     CBC auto differential; Future  -     Comprehensive metabolic panel; Future  -     DXA Bone Density Spine And Hip; Future  -     Lipid panel; Future    Postmenopausal  -     CBC  auto differential; Future  -     Comprehensive metabolic panel; Future  -     DXA Bone Density Spine And Hip; Future  -     Lipid panel; Future    Other orders  -     pravastatin (PRAVACHOL) 40 MG tablet; Take 1 tablet (40 mg total) by mouth once daily.  -     verapamil (CALAN-SR) 180 MG CR tablet; TAKE 1 TABLET ONE TIME DAILY         Continue to see Neurology.  Review labs.  Call for any other problems in follow-up in 6-12 months

## 2018-02-25 ENCOUNTER — PATIENT MESSAGE (OUTPATIENT)
Dept: INTERNAL MEDICINE | Facility: CLINIC | Age: 83
End: 2018-02-25

## 2018-03-01 ENCOUNTER — PES CALL (OUTPATIENT)
Dept: ADMINISTRATIVE | Facility: CLINIC | Age: 83
End: 2018-03-01

## 2018-04-11 ENCOUNTER — TELEPHONE (OUTPATIENT)
Dept: INTERNAL MEDICINE | Facility: CLINIC | Age: 83
End: 2018-04-11

## 2018-04-13 ENCOUNTER — OFFICE VISIT (OUTPATIENT)
Dept: NEUROLOGY | Facility: CLINIC | Age: 83
End: 2018-04-13
Payer: MEDICARE

## 2018-04-13 ENCOUNTER — HOSPITAL ENCOUNTER (OUTPATIENT)
Dept: RADIOLOGY | Facility: CLINIC | Age: 83
Discharge: HOME | End: 2018-04-13
Attending: INTERNAL MEDICINE
Payer: MEDICARE

## 2018-04-13 VITALS
DIASTOLIC BLOOD PRESSURE: 71 MMHG | HEART RATE: 81 BPM | SYSTOLIC BLOOD PRESSURE: 155 MMHG | BODY MASS INDEX: 24.15 KG/M2 | WEIGHT: 127.88 LBS | HEIGHT: 61 IN

## 2018-04-13 DIAGNOSIS — Z78.0 POSTMENOPAUSAL: ICD-10-CM

## 2018-04-13 DIAGNOSIS — G20.A1 IDIOPATHIC PARKINSON'S DISEASE: Primary | ICD-10-CM

## 2018-04-13 DIAGNOSIS — M85.80 OSTEOPENIA, UNSPECIFIED LOCATION: ICD-10-CM

## 2018-04-13 PROCEDURE — 77080 DXA BONE DENSITY AXIAL: CPT | Mod: TC

## 2018-04-13 PROCEDURE — 99499 UNLISTED E&M SERVICE: CPT | Mod: S$GLB,,, | Performed by: PSYCHIATRY & NEUROLOGY

## 2018-04-13 PROCEDURE — 77080 DXA BONE DENSITY AXIAL: CPT | Mod: 26,,, | Performed by: INTERNAL MEDICINE

## 2018-04-13 PROCEDURE — 99999 PR PBB SHADOW E&M-EST. PATIENT-LVL III: CPT | Mod: PBBFAC,,, | Performed by: PSYCHIATRY & NEUROLOGY

## 2018-04-13 PROCEDURE — 99213 OFFICE O/P EST LOW 20 MIN: CPT | Mod: S$GLB,,, | Performed by: PSYCHIATRY & NEUROLOGY

## 2018-04-13 NOTE — LETTER
April 16, 2018      Philip Karimi MD  [address]  [facility] [address]           [facility]  [address]  [facility] [address]  Phone: [phone]  Fax: [phone]          Patient: Gisselle Oseguera   MR Number: [id number]   YOB: 1931   Date of Visit: 4/13/2018       Dear Dr. Philip Karimi:    Thank you for referring Gisselle Oseguera to me for evaluation. Attached you will find relevant portions of my assessment and plan of care.    If you have questions, please do not hesitate to call me. I look forward to following Gisselle Oseguera along with you.    Sincerely,        Enclosure  CC:  No Recipients    If you would like to receive this communication electronically, please contact [email or web address] or [phone] to request more information on [employer] access.    For providers and/or their staff who would like to refer a patient to [facility], please contact us through our one-stop-shop provider referral line, [facility], at [phone].    If you feel you have received this communication in error or would no longer like to receive these types of communications, please e-mail [email or web address]

## 2018-04-13 NOTE — PROGRESS NOTES
"Name: Gisselle Oseguera  MRN: [id number]   CSN: [id number]      Date: 04/13/2018    Chief Complaint:   - still hallucinations but a bit less  - more trouble tolreating donepezil at night  - willing to take in am  - poor sleep    From 1/2018  - still some hallucinations and no benefit of nuplazid  - memory likely a bit worse  - gait unstable     From October 2017:  - worried about nuplazid  - some persistent hallucniations  - memory is holding up now  - no falls but risk  - bowels stable  - bp under control    From March 2017  1) Never tried the sleep proposal below (melatonin in PM and coffee in AM) - will do now  2) Gait is stable  3) Hallucinations and delusions still exist    History of Present Illness (HPI):  Tremor dominant PD x 10+ years, L>R, started on Requip.  Then added Sinemet.  Did well for a long time.  Now having more issues with tremor breaking through.  Amantadine caused confusion and dizzy/fainting.  Not sure about Axilect.    "Fights constipation", no hyposmia, sleeps well at night (rare dreams, no RBD), no depression or anxiety - but started Lexapro to "take the edge off".  Doesn't think the tremors got worse.    ROS:  Review of Systems   Constitutional: Negative for malaise/fatigue and weight loss.   HENT: Negative for hearing loss.    Eyes: Negative for blurred vision and double vision.   Respiratory: Negative for shortness of breath and stridor.    Cardiovascular: Negative for chest pain and palpitations.   Gastrointestinal: Positive for constipation. Negative for nausea and vomiting.   Genitourinary: Negative for frequency and urgency.   Musculoskeletal: Negative for falls and myalgias.   Skin: Negative for rash.   Neurological: Positive for tremors. Negative for focal weakness and seizures.   Endo/Heme/Allergies: Does not bruise/bleed easily.   Psychiatric/Behavioral: Negative for depression, hallucinations and memory loss. The patient is not nervous/anxious.      Past Medical History: The patient  " has a past medical history of Arthritis; Basal cell carcinoma; Cataract; Depression; Fever blister; High cholesterol; HTN (hypertension) (6/27/2013); Hypertension; Memory loss; and Parkinsonism.    Social History: The patient  reports that she quit smoking about 49 years ago. She has never used smokeless tobacco. She reports that she drinks alcohol. She reports that she does not use drugs.    Family History: Their family history includes Cancer (age of onset: 80) in her mother; Macular degeneration in her paternal aunt; No Known Problems in her brother, father, maternal aunt, maternal grandfather, maternal grandmother, maternal uncle, paternal grandfather, paternal grandmother, paternal uncle, and sister.    Allergies: Patient has no known allergies.     Meds:   Current Outpatient Prescriptions on File Prior to Visit   Medication Sig Dispense Refill    ascorbic acid, vitamin C, (VITAMIN C) 500 MG tablet Take 500 mg by mouth once daily.      aspirin (ECOTRIN) 81 MG EC tablet Take 81 mg by mouth once daily.      calcium-vitamin D3 500 mg(1,250mg) -200 unit per tablet Take 1 tablet by mouth 2 (two) times daily with meals.      carbidopa-levodopa  mg (SINEMET)  mg per tablet Take 1 tab every 2 hours while awake. 1260 tablet 3    citalopram (CELEXA) 10 MG tablet TAKE 1 TABLET ONE TIME DAILY 90 tablet 3    DOCUSATE CALCIUM (STOOL SOFTENER ORAL)       donepezil (ARICEPT) 10 MG tablet Take 1 tablet (10 mg total) by mouth every evening. 30 tablet 11    donepezil (ARICEPT) 10 MG tablet Take 1 tablet (10 mg total) by mouth every evening. 90 tablet 3    multivitamin with minerals tablet       triamterene-hydrochlorothiazide 37.5-25 mg (DYAZIDE) 37.5-25 mg per capsule TAKE 1 CAPSULE EVERY DAY 90 capsule 3    verapamil (CALAN-SR) 180 MG CR tablet TAKE 1 TABLET ONE TIME DAILY 90 tablet 3    vitamin A palmitate-vitamin D2 10,000-400 unit Tab       pravastatin (PRAVACHOL) 40 MG tablet Take 1 tablet (40 mg  "total) by mouth once daily. 90 tablet 4     No current facility-administered medications on file prior to visit.        Exam:  BP (!) 155/71   Pulse 81   Ht 5' 1" (1.549 m)   Wt 58 kg (127 lb 13.9 oz)   BMI 24.16 kg/m²     * Specialized movement exam  Hypophonic speech.    Moderate facial masking.   L>R moderate CW and mild rigidity.   L>>R resting tremor, some re-emergence with posture, + chin tremor.   Mild choreic dyskinesia only, no dystonia.   Gait is stooped, limited arm swing, mild postural instability.      Laboratory/Radiological:  - Results: none to review    - Independent review of images: none to review    Diagnoses:          1) PD - tremor dominant.   2) Parkinson's induced psychosis  3) Sleep dyscrasia - circ rhthym disorder    Medical Decision Makin) Re-start the donepezil at 5mg IN THE MORNING (1/2 of a 10 mg tablet)    Jayjay Quinones MD, MPH  Division of Movement and Memory Disorders  [facility]  [phone]        "

## 2018-04-22 ENCOUNTER — PATIENT MESSAGE (OUTPATIENT)
Dept: INTERNAL MEDICINE | Facility: CLINIC | Age: 83
End: 2018-04-22

## 2018-05-07 NOTE — TELEPHONE ENCOUNTER
----- Message from Roseline Abad sent at 5/7/2018  4:16 PM CDT -----  Contact: Steven (mary jane) @ [phone]  Pt is req a refill for carbidopa-levodopa  mg (SINEMET)  mg per tablet.      [facility] - [place], [place] - [address]  [address] [address]  Phone: [phone] Fax: [phone]

## 2018-05-08 RX ORDER — CARBIDOPA AND LEVODOPA 25; 100 MG/1; MG/1
TABLET ORAL
Qty: 1260 TABLET | Refills: 3 | Status: SHIPPED | OUTPATIENT
Start: 2018-05-08 | End: 2018-10-26 | Stop reason: SDUPTHER

## 2018-07-19 ENCOUNTER — TELEPHONE (OUTPATIENT)
Dept: NEUROLOGY | Facility: CLINIC | Age: 83
End: 2018-07-19

## 2018-07-19 NOTE — TELEPHONE ENCOUNTER
----- Message from Cindy Guerra sent at 7/19/2018  5:04 PM CDT -----  Contact: patient  Patient called to reschedule an appointment specifically with Dr Quinones  And wishes to speak with a nurse regarding this matter.    She can be reached at [phone]    Thanks  KB

## 2018-07-19 NOTE — TELEPHONE ENCOUNTER
----- Message from Tia Langston sent at 7/19/2018  1:58 PM CDT -----  Contact: Steven () [phone]  Needs Advice    Reason for call:    Steven would like to speak to someone regarding the patient's appointment     Communication Preference:PHONE     Additional Information:

## 2018-07-30 ENCOUNTER — OFFICE VISIT (OUTPATIENT)
Dept: NEUROLOGY | Facility: CLINIC | Age: 83
End: 2018-07-30
Payer: MEDICARE

## 2018-07-30 VITALS
HEIGHT: 61 IN | HEART RATE: 61 BPM | SYSTOLIC BLOOD PRESSURE: 156 MMHG | DIASTOLIC BLOOD PRESSURE: 77 MMHG | WEIGHT: 125 LBS | BODY MASS INDEX: 23.6 KG/M2

## 2018-07-30 DIAGNOSIS — G20.A1 PD (PARKINSON'S DISEASE): Primary | ICD-10-CM

## 2018-07-30 PROCEDURE — 99499 UNLISTED E&M SERVICE: CPT | Mod: HCNC,S$GLB,, | Performed by: PSYCHIATRY & NEUROLOGY

## 2018-07-30 PROCEDURE — 99999 PR PBB SHADOW E&M-EST. PATIENT-LVL III: CPT | Mod: PBBFAC,,, | Performed by: PSYCHIATRY & NEUROLOGY

## 2018-07-30 PROCEDURE — 99214 OFFICE O/P EST MOD 30 MIN: CPT | Mod: S$GLB,,, | Performed by: PSYCHIATRY & NEUROLOGY

## 2018-07-30 NOTE — LETTER
July 30, 2018      Philip Karimi MD  [address]  [facility] [address]           [facility]  [address]  [facility] [address]  Phone: [phone]  Fax: [phone]          Patient: Gisselle Oseguera   MR Number: [id number]   YOB: 1931   Date of Visit: 7/30/2018       Dear Dr. Philip Karimi:    Thank you for referring Gisselle Oseguera to me for evaluation. Attached you will find relevant portions of my assessment and plan of care.    If you have questions, please do not hesitate to call me. I look forward to following Gisselle Oseguera along with you.    Sincerely,    Jayjay Quinones MD    Enclosure  CC:  No Recipients    If you would like to receive this communication electronically, please contact [email or web address] or [phone] to request more information on [employer] Link access.    For providers and/or their staff who would like to refer a patient to [facility], please contact us through our one-stop-shop provider referral line, [facility], at [phone].    If you feel you have received this communication in error or would no longer like to receive these types of communications, please e-mail [email or web address]

## 2018-08-02 ENCOUNTER — CLINICAL SUPPORT (OUTPATIENT)
Dept: REHABILITATION | Facility: HOSPITAL | Age: 83
End: 2018-08-02
Attending: PSYCHIATRY & NEUROLOGY
Payer: MEDICARE

## 2018-08-02 DIAGNOSIS — M62.81 MUSCLE WEAKNESS (GENERALIZED): ICD-10-CM

## 2018-08-02 DIAGNOSIS — R27.8 DECREASED COORDINATION: ICD-10-CM

## 2018-08-02 DIAGNOSIS — Z78.9 IMPAIRED MOTOR CONTROL: ICD-10-CM

## 2018-08-02 DIAGNOSIS — R29.3 POOR POSTURE: ICD-10-CM

## 2018-08-02 DIAGNOSIS — Z74.09 IMPAIRED FUNCTIONAL MOBILITY, BALANCE, GAIT, AND ENDURANCE: ICD-10-CM

## 2018-08-02 PROCEDURE — G8978 MOBILITY CURRENT STATUS: HCPCS | Mod: CK,PO

## 2018-08-02 PROCEDURE — G8979 MOBILITY GOAL STATUS: HCPCS | Mod: CJ,PO

## 2018-08-02 PROCEDURE — 97162 PT EVAL MOD COMPLEX 30 MIN: CPT | Mod: PO

## 2018-08-02 NOTE — PLAN OF CARE
OUTPATIENT NEUROLOGICAL REHABILITATION  PHYSICAL THERAPY EVALUATION    Name: Gisselle Oseguera  Clinic Number: [id number]    Diagnosis:   Encounter Diagnoses   Name Primary?    Muscle weakness (generalized)     Impaired functional mobility, balance, gait, and endurance     Poor posture     Decreased coordination     Impaired motor control      Physician: Jayjay Quinones MD  Treatment Orders: PT Eval and Treat  Past Medical History:   Diagnosis Date    Arthritis     Basal cell carcinoma     Cataract     Depression     Fever blister     High cholesterol     HTN (hypertension) 6/27/2013    Hypertension     Memory loss     Parkinsonism        Evaluation Date: 8/2/18  Visit #: 1/20  Plan of care expiration:  9/27/18  Precautions: falls, safety    History     Medical Diagnosis: Parkinson's disease  PT Diagnosis: Impaired balance with hx of falls, decreased strength and endurance, impaired coordination and motor control, poor posture and decreased flexibility, gait abnormality    History of Present Illness: Gisselle is a 87 y.o. female that presents to [facility] secondary to Parkinson's disease. Pt was diagnosed ~10-15 years ago.  Pt's daughter is present and states that the pt has had Home PT and OT services briefly, but they would like to have her seen more consistently to improve pt's function and make pt's mobility as safe as possible. Pt's daughter reports that pt's functional mobility and level of assist needed varies t/o the day, depending on timing with pt's medication, but that they give her 1/2 pill of Sinimet ~ each hour to keep pt's mobility as consistent/level as possible.    Chief complaints:  1. Difficulty with movement in general- needs assist for getting in/out of bathtub  2. Pt states she takes more help than she needs for transfers and bed mobility  3. Pt ambulates sometimes without a device, sometimes with Rollator- family would like guidance with what might be a  "safer assistive device.  4. Pt reports she needs more time/effort to get off the toilet  5. Falls/impaired balance    Falls in the past year: ~4 falls   Prior Therapy: Home PT and OT very briefly  Nutrition:  Thin  Social History/Support systems: pt lives with her  in a private home.one daughter and sister in law assist with meals, bathing, etc. Pt has supportive family (10 adult children, but many of them live out of town).  Place of Residence (Steps/Adaptations/Levels): 3 [address] c/ railing, has stairs to upstairs, but they don't use upstairs  Current functional status:  Pt able to mostly get dressed on her own slowly. When family is present, they assist with showering, bathing, dressing, transfers, etc  Exercise routine prior to onset : takes walks 2 x/week, stretches, goes to the pool sometimes  DME owned: transport W/C, elevated toilet seat, shower chair, shower bench, commode (but doesn't use it), grab bars in the shower and by toilet  Work/Job description includes:  Retired. Pt states she likes to visit with kids in her free time      Subjective   Pt stated goals: "to put strength in my legs"   Family present/states: Figuring out safest assistive device and improving pt's mobility as long as possible  Pain: none- pt reports intermittent stiffness    Objective   - Follows commands: ~70% of the time- pt intermittently needs cues/commands repeated  - Speech: decreased volume    Resting tremors noted in LUE    Mental status: needs assist for date, oriented to place and self  Appearance: Casually dressed  Behavior:  calm, cooperative, adequate rapport can be established. Pt with flat affect  Attention Span and Concentration:  Normal    Dominant hand:  right     Posture Alignment in sitting: rounded shoulders, forward head, kyphotic, pt tends to lean trunk to the R      Posture Alignment in standing: rounded shoulders, forward head, kyphotic, mild trunk flexion, pt tends to lean trunk to the R, decreased lumbar " lordosis    Sensation: Light Touch: Intact         Tone: 1-  Slight increase in muscle tone,  Limbs/muscles affected: all extremities    Visual/Auditory: denies changes     Coordination:   - fine motor: intact finger to thumb  - UE coordination: impaired pron/sup    - LE coordination:  Mild impaired- heel to shin    ROM:   UPPER EXTREMITY--AROM/PROM  (R) UE: WFLs  (L) UE: WFLs           RANGE OF MOTION--LOWER EXTREMITIES  (R) LE Grossly WFL  (L) LE: Grossly WFL    Strength: manual muscle test grades below   Upper Extremity Strength  (R) UE  (L) UE    Shoulder Flexion: 5/5 Shoulder Flexion: 5/5   Shoulder Abduction: 5/5 Shoulder Abduction: 5/5   Elbow Flexion: 5/5 Elbow Flexion: 5/5   Elbow Extension: 5/5 Elbow Extension: 5/5   Wrist Flexion: 5/5 Wrist Flexion: 5/5   Wrist Extension: 5/5 Wrist Extension: 5/5   : good : fair     Lower Extremity Strength (Seated)  Right LE  Left LE    Hip Flexion: 5/5 Hip Flexion: 5/5   Hip Extension:  ./=3/5 Hip Extension: >/=3/5   Hip Abduction: 5/5 Hip Abduction: 5/5   Hip Adduction: 5/5 Hip Adduction 5/5   Knee Extension: 5/5 Knee Extension: 5/5   Knee Flexion: 5/5 Knee Flexion: 5/5   Ankle Dorsiflexion: 5/5 Ankle Dorsiflexion: 5/5   Ankle Plantarflexion: 5/5 Ankle Plantarflexion: 5/5            Evaluation   Single Limb Stance R LE NT  (<10 sec = HIGH FALL RISK)   Single Limb Stance L LE NT  (<10 sec = HIGH FALL RISK)   30 second Chair Rise 4 completed with arms and Min A at trunk- pt tends to retropulse with each stand     Postural control:  MCTSIB:  1. Eyes Open/feet together/Firm: 30 seconds  2. Eyes Closed/feet together/Firm: 30 seconds  3. Eyes Open/feet together/Foam: 3 seconds  4. Eyes Closed/feet together/Foam: TBA    Gait Assessment:   - AD used: none  - Assistance: S- CGA  - Distance: pt amb gym distances    GAIT DEVIATIONS:  Gisselle displays the following deviations with ambulation: pt with slow, shuffling gait, decreased B step length and heel strike, narrow TITA,  minimal reciprocal arm swing, intermittent freezing episodes especially with turns and initiating turns.    Impairments contributing to deviations: impaired motor control, decreased coordination, Parkinson's, decreased strength    Endurance Deficit: severe      Evaluation   Timed Up and Go 22 sec no device   Self Selected Walking Speed TBA   Fast Walking Speed TBA       Functional Mobility (Bed mobility, transfers)  Bed mobility: Mod I  Supine to sit: Min A  Sit to supine: Mod I  Transfers to bed: Mod I  Transfers to toilet: Mod I  Sit to stand:  S- Min A  Stand pivot: S  .esgc  Car transfers: Min A  Wheelchair mobility: D      Functional Limitations Reports - G Codes  Category: Mobility   Tool: Transfers   Score: Min A   Current: CK at least 40% < 60% impaired, limited or restricted  Goal: CJ at least 20% < 40% impaired, limited or restricted      Education provided re:role of PT, goals for PT, scheduling - pt verbalized understanding. Discussed insurance limitations with pt.     Gisselle verbalized good understanding of education provided.   Pt has no cultural, educational or language barriers to learning provided.      Assessment   This is a 87 y.o. female referred to outpatient physical therapy and presents with a medical diagnosis of Parkinson's disease.  Patient presents with Impaired balance with hx of falls, decreased strength and endurance, impaired coordination and motor control, poor posture and decreased flexibility, gait abnormality. PT is warranted to address these deficits and work towards following goals.    Per family, [facility] location is more convenient for pt, so they'd like to schedule follow-ups there. Pt scheduled 2 visits at [facility] to ensure continuity of care.    PT/PTA met face to face to discuss pt's treatment plan and established goals. Pt will be seen by physical therapy every 6th visit and minimally once per month.     Pt rehab potential is Good. Pt will benefit from  continuing skilled outpatient physical therapy to address the deficits listed below in the problem list, provide pt/family education and to maximize pt's level of independence in the home and community environment.     History  Co-morbidities and personal factors that may impact the plan of care Examination  Body Structures and Functions, activity limitations and participation restrictions that may impact the plan of care    Clinical Presentation   Co-morbidities:   depression, history of cancer, HTN and memory loss        Personal Factors:   age Body Regions:   back  lower extremities  upper extremities  trunk    Body Systems:    gross symmetry  ROM  strength  gross coordinated movement  balance  gait  transfers  motor control  motor learning            Participation Restrictions:   none     Activity limitations:   Learning and applying knowledge  no deficits    General Tasks and Commands  undertaking multiple tasks    Communication  Needs cues repeated at times    Mobility  lifting and carrying objects  fine hand use (grasping/picking up)  walking    Self care  washing oneself (bathing, drying, washing hands)  caring for body parts (brushing teeth, shaving, grooming)  dressing    Domestic Life  shopping  cooking  doing house work (cleaning house, washing dishes, laundry)    Interactions/Relationships  no deficits    Life Areas  no deficits    Community and Social Life  community life         evolving clinical presentation with changing clinical characteristics                      moderate   moderate  high Decision Making/ Complexity Score:  moderate             Pt's spiritual, cultural and educational needs considered and pt agreeable to plan of care and goals as stated below:     GOALS:   Short term goals: 4 weeks, pt agrees to goals set.  1. Pt will consistently perform all transfers/bed mobility with CGA or better for improved functional mobility  2. Pt will amb x >/= 500' with least restrictive assistive  device and S for improved functional mobility  3. Pt will reduce TUG time to </=17 sec for improved safety with community ambulation and decreased falls risk.  4. Pt will improve MCTSIB time in scenario 3 for >/=12s for decreased falls risk  5. Pt will perform 5 sit <> stands c/ arms, S, and no retropulsion for improved endurance and functional mobility  6. Pt will tolerate HEP for improved strength, functional mobility, ROM, posture, and endurance.      Long term goals: 8 weeks, pt agrees to goals set  7. Pt will consistently perform all transfers/bed mobility with S or better for improved functional mobility  8. Pt will reduce TUG time to </=15 sec for improved safety with community ambulation and decreased falls risk.  9. Pt will improve MCTSIB time in scenario 4 for >/=10s for decreased falls risk  10. Pt will perform HEP with assist from family for improved strength, functional mobility, ROM, posture, and endurance.  11. Pt will report feeling more steady/stronger for standing activities for improved functional mobility.      Plan   Outpatient physical therapy 2 times weekly to include: Pt Education, HEP, therapeutic exercises, neuromuscular re-education, therapeutic activities, manual therapy, joint mobilizations, aquatic therapy and modalities PRN to achieve established goals. Pt may be seen by PTA as part of the rehabilitation team.     Cont PT for  8 weeks.     Glenny Evangelista, PT  08/03/2018

## 2018-08-03 PROBLEM — R27.8 DECREASED COORDINATION: Status: ACTIVE | Noted: 2018-08-03

## 2018-08-03 PROBLEM — M62.81 MUSCLE WEAKNESS (GENERALIZED): Status: ACTIVE | Noted: 2018-08-03

## 2018-08-03 PROBLEM — Z74.09 IMPAIRED FUNCTIONAL MOBILITY, BALANCE, GAIT, AND ENDURANCE: Status: ACTIVE | Noted: 2018-08-03

## 2018-08-03 PROBLEM — Z78.9 IMPAIRED MOTOR CONTROL: Status: ACTIVE | Noted: 2018-08-03

## 2018-08-03 PROBLEM — R29.3 POOR POSTURE: Status: ACTIVE | Noted: 2018-08-03

## 2018-08-06 ENCOUNTER — CLINICAL SUPPORT (OUTPATIENT)
Dept: REHABILITATION | Facility: HOSPITAL | Age: 83
End: 2018-08-06
Payer: MEDICARE

## 2018-08-06 DIAGNOSIS — R27.8 DECREASED COORDINATION: ICD-10-CM

## 2018-08-06 DIAGNOSIS — M62.81 MUSCLE WEAKNESS (GENERALIZED): ICD-10-CM

## 2018-08-06 DIAGNOSIS — R29.3 POOR POSTURE: ICD-10-CM

## 2018-08-06 DIAGNOSIS — Z78.9 IMPAIRED MOTOR CONTROL: ICD-10-CM

## 2018-08-06 DIAGNOSIS — Z74.09 IMPAIRED FUNCTIONAL MOBILITY, BALANCE, GAIT, AND ENDURANCE: ICD-10-CM

## 2018-08-06 PROCEDURE — 97112 NEUROMUSCULAR REEDUCATION: CPT | Mod: PN

## 2018-08-06 PROCEDURE — 97110 THERAPEUTIC EXERCISES: CPT | Mod: PN

## 2018-08-06 PROCEDURE — 97140 MANUAL THERAPY 1/> REGIONS: CPT | Mod: PN

## 2018-08-06 NOTE — PROGRESS NOTES
DAILY TREATMENT NOTE    DATE: 8/6/2018    Start Time:  1015  Stop Time:  1100    PROCEDURES:    TIMED  Procedure Min.   NMR 10   MT 20   TE 15             UNTIMED  Procedure Min.             Total Timed Minutes:  45  Total Timed Units:  3  Total Untimed Units:  0  Charges Billed/# of units:  3 (1NMR, 1MT, 1TE)      Progress/Current Status    Subjective:     Patient ID: Gisselle Oseguera is a 87 y.o. female.  Diagnosis:   1. Muscle weakness (generalized)     2. Impaired functional mobility, balance, gait, and endurance     3. Poor posture     4. Decreased coordination     5. Impaired motor control       Pain: 0 /10  Pt A+O x 4 and she stated that she enjoyed coming out to PT.    Objective:     Session initiated with short re-assess with no change at this clinic f/b Neuro re-ed and endurance training with B UE/LE extremities for reciprocal motion of all limbs on sci-fit x 8 min at level 1 at > or equal to 50 spm w/o rest. PT performed the following stretches to increase AROM and PROM in pt's L and R side: B hip rotation stretches, B hamstring stretches all manually 3-5x 30'' holds. Pt performed TE per log with cues x 15' with PT 1:1.      DATE 8/6/18   Visit 2/10   G CODE  POC exp 10/2/18 2/10     FOTO 2/5   Visit Amount  Total 92.25  175.13   Standing feet apart ---   Standing feet together --   Standing 1/2 tandem --   Sit to stand --   SLS R --   SLS L --   Side stepping // -   FLoor to ceiling --   Side to side seated -   Standing side step and reach --   Beep board --   SAQ 2 x 10 B   SLR --   clamshells 2 x 10 B supine   Hip abduction --   bridges 2 x 10   Adduction isometric --   LAQ --   HS curls -   HS stretch with strap --   MT stretches 20' see note   Leg press -   Hip extension --   Hip flexion --   Hip Abduction -   Hip adduction --   Knee flexion --   Toe raises --   Heel raises --   Nu-step --   Step ups --   Lateral step ups --   Gait --   INITIALS FS       Assessment:     Pt would be good candidate for  LSVT BIG/LOUD protocol if her family could bring her 4x/wk for 4 weeks.  This will remain TBD while family decides on what works well for them and the pt.     Patient Education/Response:     CONT HEP    Plans and Goals:     Pt's spiritual, cultural and educational needs considered and pt agreeable to plan of care and goals as stated below:      Progress pt in balance and strength and endurance as tolerated .    GOALS:   Short term goals: 4 weeks, pt agrees to goals set.  1. Pt will consistently perform all transfers/bed mobility with CGA or better for improved functional mobility  2. Pt will amb x >/= 500' with least restrictive assistive device and S for improved functional mobility  3. Pt will reduce TUG time to </=17 sec for improved safety with community ambulation and decreased falls risk.  4. Pt will improve MCTSIB time in scenario 3 for >/=12s for decreased falls risk  5. Pt will perform 5 sit <> stands c/ arms, S, and no retropulsion for improved endurance and functional mobility  6. Pt will tolerate HEP for improved strength, functional mobility, ROM, posture, and endurance.        Long term goals: 8 weeks, pt agrees to goals set  7. Pt will consistently perform all transfers/bed mobility with S or better for improved functional mobility  8. Pt will reduce TUG time to </=15 sec for improved safety with community ambulation and decreased falls risk.  9. Pt will improve MCTSIB time in scenario 4 for >/=10s for decreased falls risk  10. Pt will perform HEP with assist from family for improved strength, functional mobility, ROM, posture, and endurance.  11. Pt will report feeling more steady/stronger for standing activities for improved functional mobility.

## 2018-08-30 ENCOUNTER — PES CALL (OUTPATIENT)
Dept: ADMINISTRATIVE | Facility: CLINIC | Age: 83
End: 2018-08-30

## 2018-09-17 ENCOUNTER — PATIENT MESSAGE (OUTPATIENT)
Dept: INTERNAL MEDICINE | Facility: CLINIC | Age: 83
End: 2018-09-17

## 2018-09-18 RX ORDER — CITALOPRAM 10 MG/1
TABLET ORAL
Qty: 90 TABLET | Refills: 3 | OUTPATIENT
Start: 2018-09-18

## 2018-09-24 ENCOUNTER — PATIENT MESSAGE (OUTPATIENT)
Dept: INTERNAL MEDICINE | Facility: CLINIC | Age: 83
End: 2018-09-24

## 2018-09-24 ENCOUNTER — OFFICE VISIT (OUTPATIENT)
Dept: INTERNAL MEDICINE | Facility: CLINIC | Age: 83
End: 2018-09-24
Payer: MEDICARE

## 2018-09-24 ENCOUNTER — HOSPITAL ENCOUNTER (OUTPATIENT)
Dept: RADIOLOGY | Facility: HOSPITAL | Age: 83
Discharge: HOME | End: 2018-09-24
Attending: INTERNAL MEDICINE
Payer: MEDICARE

## 2018-09-24 VITALS
OXYGEN SATURATION: 99 % | DIASTOLIC BLOOD PRESSURE: 82 MMHG | HEIGHT: 63 IN | BODY MASS INDEX: 19.88 KG/M2 | WEIGHT: 112.19 LBS | HEART RATE: 71 BPM | SYSTOLIC BLOOD PRESSURE: 124 MMHG

## 2018-09-24 DIAGNOSIS — G20.C PARKINSONISM, UNSPECIFIED PARKINSONISM TYPE: ICD-10-CM

## 2018-09-24 DIAGNOSIS — R53.83 FATIGUE, UNSPECIFIED TYPE: ICD-10-CM

## 2018-09-24 DIAGNOSIS — G20.C PARKINSONISM, UNSPECIFIED PARKINSONISM TYPE: Primary | ICD-10-CM

## 2018-09-24 DIAGNOSIS — R63.0 DECREASED APPETITE: ICD-10-CM

## 2018-09-24 DIAGNOSIS — Z74.09 IMPAIRED FUNCTIONAL MOBILITY, BALANCE, GAIT, AND ENDURANCE: ICD-10-CM

## 2018-09-24 PROCEDURE — 71046 X-RAY EXAM CHEST 2 VIEWS: CPT | Mod: 26,,, | Performed by: RADIOLOGY

## 2018-09-24 PROCEDURE — 99213 OFFICE O/P EST LOW 20 MIN: CPT | Mod: PBBFAC,25 | Performed by: INTERNAL MEDICINE

## 2018-09-24 PROCEDURE — 1101F PT FALLS ASSESS-DOCD LE1/YR: CPT | Mod: CPTII,,, | Performed by: INTERNAL MEDICINE

## 2018-09-24 PROCEDURE — 99214 OFFICE O/P EST MOD 30 MIN: CPT | Mod: S$PBB,,, | Performed by: INTERNAL MEDICINE

## 2018-09-24 PROCEDURE — 99999 PR PBB SHADOW E&M-EST. PATIENT-LVL III: CPT | Mod: PBBFAC,,, | Performed by: INTERNAL MEDICINE

## 2018-09-24 PROCEDURE — 71046 X-RAY EXAM CHEST 2 VIEWS: CPT | Mod: TC

## 2018-09-24 NOTE — PROGRESS NOTES
Subjective:       Patient ID: Gisselle Oseguera is a 87 y.o. female.    Chief Complaint: Fatigue (3 weeks); Anorexia; and Shortness of Breath    Patient was significant parkinsonism is brought in by her daughter for just not feeling well for 2-3 weeks.  Appetite is down a little, activity and energy are down a little.  She has felt weaker few times.  She has had some mild diarrhea but maybe took stool softener or laxative.  She does not think or parkinsonism is worse.  No headaches.  No fever.  No rash.  No abdominal pain burning with urination or blood in the urine      Review of Systems   Constitutional: Positive for fatigue. Negative for appetite change, chills and fever.   HENT: Negative for nosebleeds, sinus pain and sore throat.    Eyes: Negative for visual disturbance.   Respiratory: Negative for cough, shortness of breath and wheezing.    Cardiovascular: Negative for chest pain and leg swelling.   Gastrointestinal: Negative for abdominal pain, constipation and diarrhea.   Genitourinary: Negative for difficulty urinating and hematuria.   Musculoskeletal: Negative for neck pain and neck stiffness.   Skin: Negative for pallor and rash.   Neurological: Positive for tremors and weakness. Negative for headaches.   Psychiatric/Behavioral: Negative for dysphoric mood and suicidal ideas. The patient is not nervous/anxious.        Objective:      Physical Exam   Constitutional: She is oriented to person, place, and time. She appears well-developed and well-nourished. No distress.   HENT:   Head: Normocephalic and atraumatic.   Right Ear: External ear normal.   Left Ear: External ear normal.   Mouth/Throat: Oropharynx is clear and moist. No oropharyngeal exudate.   Eyes: Conjunctivae are normal. Pupils are equal, round, and reactive to light. No scleral icterus.   Neck: Normal range of motion. Neck supple. No thyromegaly present.   Cardiovascular: Normal rate and regular rhythm.   No murmur heard.  Pulmonary/Chest:  Effort normal and breath sounds normal. No stridor. She has no wheezes. She has no rales.   Abdominal: Soft. Bowel sounds are normal. She exhibits no distension and no mass. There is no tenderness. There is no guarding.   Musculoskeletal: She exhibits no tenderness.   Lymphadenopathy:     She has no cervical adenopathy.   Neurological: She is alert and oriented to person, place, and time. She exhibits abnormal muscle tone. Coordination abnormal.   Significant tremor and choreiform movement, chronic   Skin: No rash noted. No pallor.   Psychiatric: She has a normal mood and affect.       Assessment:       1. Parkinsonism, unspecified Parkinsonism type    2. Impaired functional mobility, balance, gait, and endurance    3. Fatigue, unspecified type    4. Decreased appetite        Plan:       Gisselle was seen today for fatigue, anorexia and shortness of breath.    Diagnoses and all orders for this visit:    Parkinsonism, unspecified Parkinsonism type  -     CBC auto differential; Future  -     Basic metabolic panel; Future  -     Urine culture; Future  -     X-Ray Chest PA And Lateral; Future  -     TSH; Future    Impaired functional mobility, balance, gait, and endurance  -     CBC auto differential; Future  -     Basic metabolic panel; Future  -     Urine culture; Future  -     X-Ray Chest PA And Lateral; Future  -     TSH; Future    Fatigue, unspecified type  -     CBC auto differential; Future  -     Basic metabolic panel; Future  -     Urine culture; Future  -     X-Ray Chest PA And Lateral; Future  -     TSH; Future    Decreased appetite  -     CBC auto differential; Future  -     Basic metabolic panel; Future  -     Urine culture; Future  -     X-Ray Chest PA And Lateral; Future  -     TSH; Future        I stressed monitoring intake, medication, vitals in activities.  Watch for fever, rash.  Will check chest x-ray urine and lab and review.

## 2018-09-25 ENCOUNTER — PATIENT MESSAGE (OUTPATIENT)
Dept: INTERNAL MEDICINE | Facility: CLINIC | Age: 83
End: 2018-09-25

## 2018-09-25 DIAGNOSIS — R91.1 LUNG NODULE: Primary | ICD-10-CM

## 2018-09-25 DIAGNOSIS — R63.4 WEIGHT LOSS: ICD-10-CM

## 2018-10-02 ENCOUNTER — HOSPITAL ENCOUNTER (OUTPATIENT)
Dept: RADIOLOGY | Facility: HOSPITAL | Age: 83
Discharge: HOME | End: 2018-10-02
Attending: INTERNAL MEDICINE
Payer: MEDICARE

## 2018-10-02 DIAGNOSIS — R91.1 LUNG NODULE: ICD-10-CM

## 2018-10-02 DIAGNOSIS — R63.4 WEIGHT LOSS: ICD-10-CM

## 2018-10-02 PROCEDURE — 71250 CT THORAX DX C-: CPT | Mod: 26,,, | Performed by: RADIOLOGY

## 2018-10-02 PROCEDURE — 71250 CT THORAX DX C-: CPT | Mod: TC

## 2018-10-04 ENCOUNTER — PATIENT MESSAGE (OUTPATIENT)
Dept: INTERNAL MEDICINE | Facility: CLINIC | Age: 83
End: 2018-10-04

## 2018-10-08 ENCOUNTER — TELEPHONE (OUTPATIENT)
Dept: INTERNAL MEDICINE | Facility: CLINIC | Age: 83
End: 2018-10-08

## 2018-10-08 DIAGNOSIS — R91.1 LUNG NODULE: ICD-10-CM

## 2018-10-26 ENCOUNTER — OFFICE VISIT (OUTPATIENT)
Dept: NEUROLOGY | Facility: CLINIC | Age: 83
End: 2018-10-26
Payer: MEDICARE

## 2018-10-26 VITALS
DIASTOLIC BLOOD PRESSURE: 77 MMHG | HEART RATE: 61 BPM | SYSTOLIC BLOOD PRESSURE: 145 MMHG | BODY MASS INDEX: 20.2 KG/M2 | HEIGHT: 63 IN | WEIGHT: 114 LBS

## 2018-10-26 DIAGNOSIS — G20.A1 PARKINSON DISEASE: Primary | ICD-10-CM

## 2018-10-26 PROCEDURE — 99214 OFFICE O/P EST MOD 30 MIN: CPT | Mod: S$PBB,,, | Performed by: PSYCHIATRY & NEUROLOGY

## 2018-10-26 PROCEDURE — 99999 PR PBB SHADOW E&M-EST. PATIENT-LVL III: CPT | Mod: PBBFAC,,, | Performed by: PSYCHIATRY & NEUROLOGY

## 2018-10-26 PROCEDURE — 99213 OFFICE O/P EST LOW 20 MIN: CPT | Mod: PBBFAC | Performed by: PSYCHIATRY & NEUROLOGY

## 2018-10-26 PROCEDURE — 99499 UNLISTED E&M SERVICE: CPT | Mod: HCNC,S$GLB,, | Performed by: PSYCHIATRY & NEUROLOGY

## 2018-10-26 PROCEDURE — 1101F PT FALLS ASSESS-DOCD LE1/YR: CPT | Mod: CPTII,,, | Performed by: PSYCHIATRY & NEUROLOGY

## 2018-10-26 RX ORDER — CARBIDOPA AND LEVODOPA 25; 100 MG/1; MG/1
TABLET ORAL
Qty: 1260 TABLET | Refills: 3 | Status: SHIPPED | OUTPATIENT
Start: 2018-10-26 | End: 2019-04-03 | Stop reason: SDUPTHER

## 2018-10-26 NOTE — PROGRESS NOTES
"Name: Gisselle Oseguera  MRN: [id number]   CSN: [id number]      Date: 10/26/2018    Chief Complaint:   - hallucinations are still there at night, some stacking  - taking her meds every hour on the 1/2 hour  - says her routine is about the same  - had some spell of low appetite, lost 10-15 lbs over three months  - had some GI upset  -  -    From July 2018:  - tremor is a little worse overall  - chronic LBP now, worse in the afternoon - hard to describe, but severe, responds to CD/LD.  - now taking 1/2 tab every hour  - naps help in the afternoon  - some walking for 1/2 mile over 30 minutes or so  - interested in PT    From April 2018:  - still hallucinations but a bit less  - more trouble tolreating donepezil at night  - willing to take in am  - poor sleep    From 1/2018  - still some hallucinations and no benefit of nuplazid  - memory likely a bit worse  - gait unstable     From October 2017:  - worried about nuplazid  - some persistent hallucniations  - memory is holding up now  - no falls but risk  - bowels stable  - bp under control    From March 2017  1) Never tried the sleep proposal below (melatonin in PM and coffee in AM) - will do now  2) Gait is stable  3) Hallucinations and delusions still exist    History of Present Illness (HPI):  Tremor dominant PD x 10+ years, L>R, started on Requip.  Then added Sinemet.  Did well for a long time.  Now having more issues with tremor breaking through.  Amantadine caused confusion and dizzy/fainting.  Not sure about Axilect.    "Fights constipation", no hyposmia, sleeps well at night (rare dreams, no RBD), no depression or anxiety - but started Lexapro to "take the edge off".  Doesn't think the tremors got worse.    ROS:  Review of Systems   Constitutional: Negative for malaise/fatigue and weight loss.   HENT: Negative for hearing loss.    Eyes: Negative for blurred vision and double vision.   Respiratory: Negative for shortness of breath and stridor.    Cardiovascular: " Negative for chest pain and palpitations.   Gastrointestinal: Positive for constipation. Negative for nausea and vomiting.   Genitourinary: Negative for frequency and urgency.   Musculoskeletal: Negative for falls and myalgias.   Skin: Negative for rash.   Neurological: Positive for tremors. Negative for focal weakness and seizures.   Endo/Heme/Allergies: Does not bruise/bleed easily.   Psychiatric/Behavioral: Negative for depression, hallucinations and memory loss. The patient is not nervous/anxious.      Past Medical History: The patient  has a past medical history of Arthritis, Basal cell carcinoma, Cataract, Depression, Fever blister, High cholesterol, HTN (hypertension) (6/27/2013), Hypertension, Memory loss, and Parkinsonism.    Social History: The patient  reports that she quit smoking about 50 years ago. she has never used smokeless tobacco. She reports that she drinks alcohol. She reports that she does not use drugs.    Family History: Their family history includes Cancer (age of onset: 80) in her mother; Macular degeneration in her paternal aunt; No Known Problems in her brother, father, maternal aunt, maternal grandfather, maternal grandmother, maternal uncle, paternal grandfather, paternal grandmother, paternal uncle, and sister.    Allergies: Patient has no known allergies.     Meds:   Current Outpatient Medications on File Prior to Visit   Medication Sig Dispense Refill    ascorbic acid, vitamin C, (VITAMIN C) 500 MG tablet Take 500 mg by mouth once daily.      aspirin (ECOTRIN) 81 MG EC tablet Take 81 mg by mouth once daily.      calcium-vitamin D3 500 mg(1,250mg) -200 unit per tablet Take 1 tablet by mouth 2 (two) times daily with meals.      carbidopa-levodopa  mg (SINEMET)  mg per tablet Take 1 tab every 2 hours while awake. (Patient taking differently: Half tablet every hour) 1260 tablet 3    citalopram (CELEXA) 10 MG tablet TAKE 1 TABLET ONE TIME DAILY 90 tablet 3    DOCUSATE  "CALCIUM (STOOL SOFTENER ORAL)       donepezil (ARICEPT) 10 MG tablet Take 1 tablet (10 mg total) by mouth every evening. (Patient taking differently: Take 5 mg by mouth every evening. ) 30 tablet 11    influenza (FLUZONE HIGH-DOSE) 180 mcg/0.5 mL vaccine Inject into the muscle. 0.5 mL 0    multivitamin with minerals tablet       pravastatin (PRAVACHOL) 40 MG tablet Take 1 tablet (40 mg total) by mouth once daily. 90 tablet 4    triamterene-hydrochlorothiazide 37.5-25 mg (DYAZIDE) 37.5-25 mg per capsule TAKE 1 CAPSULE EVERY DAY 90 capsule 3    verapamil (CALAN-SR) 180 MG CR tablet TAKE 1 TABLET ONE TIME DAILY 90 tablet 3    vitamin A palmitate-vitamin D2 10,000-400 unit Tab        No current facility-administered medications on file prior to visit.        Exam:  BP (!) 145/77   Pulse 61   Ht 5' 3" (1.6 m)   Wt 51.7 kg (113 lb 15.7 oz)   BMI 20.19 kg/m²     * Specialized movement exam  Hypophonic speech.    Moderate facial masking.   L>R moderate CW and mild rigidity.   L>>R resting tremor, some re-emergence with posture, + chin tremor.   Mild choreic dyskinesia only, no dystonia.   Gait is stooped, limited arm swing, mild postural instability.      Laboratory/Radiological:  - Results: none to review    - Independent review of images: none to review    Diagnoses:          1) PD - tremor dominant.   2) Parkinson's induced psychosis  3) Sleep dyscrasia - circ rhthym disorder    Medical Decision Making:   balance of cd/ld now to average 1/2 tab q1-2 hour  Watch hallucinations  Pt/ot          Jayjay Quinones MD, MPH  Division of Movement and Memory Disorders  [facility]  [phone]        "

## 2018-10-26 NOTE — LETTER
October 30, 2018      Philip Karimi MD  [address] [address]  [facility] [address]           [facility]  Neurology  [address]  [facility] [address]  Phone: [phone]  Fax: [phone]          Patient: Gisselle Oseguera   MR Number: [id number]   YOB: 1931   Date of Visit: 10/26/2018       Dear Dr. Philip Karimi:    Thank you for referring Gisselle Oseguera to me for evaluation. Attached you will find relevant portions of my assessment and plan of care.    If you have questions, please do not hesitate to call me. I look forward to following Gisselle Oseguera along with you.    Sincerely,    Jayjay Quinones MD    Enclosure  CC:  No Recipients    If you would like to receive this communication electronically, please contact [email or web address] or [phone] to request more information on [employer] Link access.    For providers and/or their staff who would like to refer a patient to [facility], please contact us through our one-stop-shop provider referral line, [facility], at [phone].    If you feel you have received this communication in error or would no longer like to receive these types of communications, please e-mail [email or web address]

## 2018-11-06 ENCOUNTER — PATIENT MESSAGE (OUTPATIENT)
Dept: INTERNAL MEDICINE | Facility: CLINIC | Age: 83
End: 2018-11-06

## 2018-11-21 ENCOUNTER — CLINICAL SUPPORT (OUTPATIENT)
Dept: URGENT CARE | Facility: CLINIC | Age: 83
End: 2018-11-21

## 2018-11-21 VITALS
OXYGEN SATURATION: 96 % | WEIGHT: 110 LBS | TEMPERATURE: 98 F | DIASTOLIC BLOOD PRESSURE: 72 MMHG | RESPIRATION RATE: 20 BRPM | HEIGHT: 63 IN | BODY MASS INDEX: 19.49 KG/M2 | SYSTOLIC BLOOD PRESSURE: 142 MMHG

## 2018-11-21 DIAGNOSIS — Z48.02 ENCOUNTER FOR STAPLE REMOVAL: Primary | ICD-10-CM

## 2018-11-21 PROCEDURE — S0630 REMOVAL OF SUTURES: HCPCS | Mod: S$GLB,,, | Performed by: PHYSICIAN ASSISTANT

## 2018-11-21 NOTE — PATIENT INSTRUCTIONS
"- Rest.    - Drink plenty of fluids.    - Tylenol or Ibuprofen as directed as needed for fever/pain.    - Follow up with your PCP or specialty clinic as directed in the next 1-2 weeks if not improved or as needed.  You can call [phone] to schedule an appointment with the appropriate provider.    - Go to the ED if your symptoms worsen.  - You must understand that you have received an Urgent Care treatment only and that you may be released before all of your medical problems are known or treated.   - You, the patient, will arrange for follow up care as instructed.   - If your condition worsens or fails to improve we recommend that you receive another evaluation at the ER immediately or contact your PCP to discuss your concerns or return here.       Suture or Staple Removal  You were seen today for a suture or staple removal. Your wound is healing as expected. The wound has healed well enough that the sutures or staples can be removed. The wound will continue to heal for the next few months.  At this time there is no sign of infection.   Home care  · If you have pain, take pain medicine as advised by your healthcare provider.   · Keep your wound clean and protected by covering it with a bandage for the next week or so.   · Wash your hands with soap and warm water before and after caring for your wound. This helps prevent infection.  · Clean the wound gently with soap and warm water daily or as directed by your childs health care provider. Do not use iodine, alcohol, or other cleansers on the wound.  Gently pat it dry. Put on a new bandage, if needed. Do not reuse bandages.  · If the area gets wet, gently pat it dry with a clean cloth. Replace the wet bandage with a dry one.  · Check the wound daily for signs of infection. (These are listed under "When to seek medical advice" below.)  · You may shower and bathe as usual. Swimming is now permitted.  Follow-up care  Follow up with your healthcare provider as " advised.  When to seek medical advice   Call your healthcare provider if any of the following occur:  · Wound reopens or bleeds  · Signs of an infection, such as:  ¨ Increasing redness or swelling around the wound  ¨ Increased warmth from the wound  ¨ Worsening pain  ¨ Red streaking lines away from the wound  ¨ Fluid draining from the wound  · Fever of 100.4°F (38°C) or higher, or as directed by your child's healthcare provider  Date Last Reviewed: 9/27/2015 © 2000-2017 [employer] [address] [place] [address], [place] [address]. All rights reserved. This information is not intended as a substitute for professional medical care. Always follow your healthcare professional's instructions.

## 2018-11-21 NOTE — PROGRESS NOTES
"Subjective:       Patient ID: Gisselle Oseguera is a 87 y.o. female.    Vitals:  height is 5' 3" (1.6 m) and weight is 49.9 kg (110 lb). Her oral temperature is 97.5 °F (36.4 °C). Her blood pressure is 142/72 (abnormal). Her respiration is 20 and oxygen saturation is 96%.     Chief Complaint: Suture / Staple Removal (Placed 10 days ago at [facility] ER)    This is a 87 y.o. female who presents today with a chief complaint of staple removal.  Patient's son states the staples were place 10 days ago at [facility].  Sutures are on the back of her head.        Suture / Staple Removal   The sutures were placed 7 to 10 days ago. The treatment provided significant relief. Her temperature was unmeasured prior to arrival. There has been no drainage from the wound. There is no redness present. There is no swelling present. There is no pain present.       Constitution: Negative for fever.   Neck: Negative for painful lymph nodes.   Cardiovascular: Negative for chest pain.   Eyes: Negative for blurred vision.   Respiratory: Negative for shortness of breath.    Skin: Positive for wound (healing with staples in place). Negative for erythema.   Hematologic/Lymphatic: Negative for swollen lymph nodes.       Objective:      Physical Exam   Constitutional: She is oriented to person, place, and time. She appears well-developed and well-nourished.   HENT:   Head: Normocephalic and atraumatic.   Eyes: Conjunctivae are normal.   Neck: Normal range of motion. Neck supple. No thyromegaly present.   Cardiovascular: Normal rate and regular rhythm. Exam reveals no gallop and no friction rub.   No murmur heard.  Pulmonary/Chest: Effort normal and breath sounds normal. She has no wheezes. She has no rales.   Musculoskeletal: Normal range of motion.   Lymphadenopathy:     She has no cervical adenopathy.   Neurological: She is alert and oriented to person, place, and time.   Skin: Skin is warm and dry. Laceration (well-healed, with 6 staples in place, to the " posterior scalp) noted. No rash noted. No erythema.   Psychiatric: She has a normal mood and affect. Her behavior is normal. Judgment and thought content normal.   Nursing note and vitals reviewed.      Assessment:       1. Encounter for staple removal        Plan:         Encounter for staple removal  -     Suture Removal        Gisselle was seen today for suture / staple removal.    Diagnoses and all orders for this visit:    Encounter for staple removal  -     Suture Removal      Patient Instructions   - Rest.    - Drink plenty of fluids.    - Tylenol or Ibuprofen as directed as needed for fever/pain.    - Follow up with your PCP or specialty clinic as directed in the next 1-2 weeks if not improved or as needed.  You can call [phone] to schedule an appointment with the appropriate provider.    - Go to the ED if your symptoms worsen.  - You must understand that you have received an Urgent Care treatment only and that you may be released before all of your medical problems are known or treated.   - You, the patient, will arrange for follow up care as instructed.   - If your condition worsens or fails to improve we recommend that you receive another evaluation at the ER immediately or contact your PCP to discuss your concerns or return here.       Suture or Staple Removal  You were seen today for a suture or staple removal. Your wound is healing as expected. The wound has healed well enough that the sutures or staples can be removed. The wound will continue to heal for the next few months.  At this time there is no sign of infection.   Home care  · If you have pain, take pain medicine as advised by your healthcare provider.   · Keep your wound clean and protected by covering it with a bandage for the next week or so.   · Wash your hands with soap and warm water before and after caring for your wound. This helps prevent infection.  · Clean the wound gently with soap and warm water daily or as directed by your  "childs health care provider. Do not use iodine, alcohol, or other cleansers on the wound.  Gently pat it dry. Put on a new bandage, if needed. Do not reuse bandages.  · If the area gets wet, gently pat it dry with a clean cloth. Replace the wet bandage with a dry one.  · Check the wound daily for signs of infection. (These are listed under "When to seek medical advice" below.)  · You may shower and bathe as usual. Swimming is now permitted.  Follow-up care  Follow up with your healthcare provider as advised.  When to seek medical advice   Call your healthcare provider if any of the following occur:  · Wound reopens or bleeds  · Signs of an infection, such as:  ¨ Increasing redness or swelling around the wound  ¨ Increased warmth from the wound  ¨ Worsening pain  ¨ Red streaking lines away from the wound  ¨ Fluid draining from the wound  · Fever of 100.4°F (38°C) or higher, or as directed by your child's healthcare provider  Date Last Reviewed: 9/27/2015  © 1931-7627 [employer]. [address] [place] [address]. All rights reserved. This information is not intended as a substitute for professional medical care. Always follow your healthcare professional's instructions.          Gisselle was seen today for suture / staple removal.    Diagnoses and all orders for this visit:    Encounter for staple removal  -     Suture Removal      Patient Instructions   - Rest.    - Drink plenty of fluids.    - Tylenol or Ibuprofen as directed as needed for fever/pain.    - Follow up with your PCP or specialty clinic as directed in the next 1-2 weeks if not improved or as needed.  You can call [phone] to schedule an appointment with the appropriate provider.    - Go to the ED if your symptoms worsen.  - You must understand that you have received an Urgent Care treatment only and that you may be released before all of your medical problems are known or treated.   - You, the patient, will arrange for follow " "up care as instructed.   - If your condition worsens or fails to improve we recommend that you receive another evaluation at the ER immediately or contact your PCP to discuss your concerns or return here.       Suture or Staple Removal  You were seen today for a suture or staple removal. Your wound is healing as expected. The wound has healed well enough that the sutures or staples can be removed. The wound will continue to heal for the next few months.  At this time there is no sign of infection.   Home care  · If you have pain, take pain medicine as advised by your healthcare provider.   · Keep your wound clean and protected by covering it with a bandage for the next week or so.   · Wash your hands with soap and warm water before and after caring for your wound. This helps prevent infection.  · Clean the wound gently with soap and warm water daily or as directed by your childs health care provider. Do not use iodine, alcohol, or other cleansers on the wound.  Gently pat it dry. Put on a new bandage, if needed. Do not reuse bandages.  · If the area gets wet, gently pat it dry with a clean cloth. Replace the wet bandage with a dry one.  · Check the wound daily for signs of infection. (These are listed under "When to seek medical advice" below.)  · You may shower and bathe as usual. Swimming is now permitted.  Follow-up care  Follow up with your healthcare provider as advised.  When to seek medical advice   Call your healthcare provider if any of the following occur:  · Wound reopens or bleeds  · Signs of an infection, such as:  ¨ Increasing redness or swelling around the wound  ¨ Increased warmth from the wound  ¨ Worsening pain  ¨ Red streaking lines away from the wound  ¨ Fluid draining from the wound  · Fever of 100.4°F (38°C) or higher, or as directed by your child's healthcare provider  Date Last Reviewed: 9/27/2015  © 9879-5772 [employer]. [address] [place] [address], [place] [address]. All rights " reserved. This information is not intended as a substitute for professional medical care. Always follow your healthcare professional's instructions.

## 2018-11-21 NOTE — PROCEDURES
Suture Removal  Date/Time: 11/21/2018 3:42 PM  Performed by: Alyson Lewis PA-C  Authorized by: Alyson Lewis PA-C   Body area: head/neck  Location details: scalp  Wound Appearance: clean, well healed, nontender and no drainage  Staples Removed: 6  Post-removal: no dressing applied

## 2018-12-17 RX ORDER — CITALOPRAM 10 MG/1
TABLET ORAL
Qty: 90 TABLET | Refills: 3 | OUTPATIENT
Start: 2018-12-17

## 2019-01-02 ENCOUNTER — TELEPHONE (OUTPATIENT)
Dept: INTERNAL MEDICINE | Facility: CLINIC | Age: 84
End: 2019-01-02

## 2019-01-02 DIAGNOSIS — I10 ESSENTIAL HYPERTENSION: ICD-10-CM

## 2019-01-02 DIAGNOSIS — Z74.09 IMPAIRED FUNCTIONAL MOBILITY, BALANCE, GAIT, AND ENDURANCE: ICD-10-CM

## 2019-01-02 DIAGNOSIS — E78.5 DYSLIPIDEMIA: ICD-10-CM

## 2019-01-02 DIAGNOSIS — G20.A1 IDIOPATHIC PARKINSON'S DISEASE: Primary | ICD-10-CM

## 2019-01-02 NOTE — TELEPHONE ENCOUNTER
----- Message from Jose Grace sent at 1/2/2019  1:16 PM CST -----  Contact: Daughter  03/01/19 Annual Physical need lab orders placed and linked  Thank you

## 2019-01-03 RX ORDER — CITALOPRAM 10 MG/1
TABLET ORAL
Qty: 90 TABLET | Refills: 3 | Status: SHIPPED | OUTPATIENT
Start: 2019-01-03 | End: 2019-08-09 | Stop reason: SDUPTHER

## 2019-01-29 ENCOUNTER — HOSPITAL ENCOUNTER (OUTPATIENT)
Dept: RADIOLOGY | Facility: HOSPITAL | Age: 84
Discharge: HOME | End: 2019-01-29
Attending: INTERNAL MEDICINE
Payer: MEDICARE

## 2019-01-29 DIAGNOSIS — R91.1 LUNG NODULE: ICD-10-CM

## 2019-01-29 PROCEDURE — 71250 CT CHEST WITHOUT CONTRAST: ICD-10-PCS | Mod: 26,HCNC,, | Performed by: RADIOLOGY

## 2019-01-29 PROCEDURE — 71250 CT THORAX DX C-: CPT | Mod: TC,HCNC

## 2019-01-29 PROCEDURE — 71250 CT THORAX DX C-: CPT | Mod: 26,HCNC,, | Performed by: RADIOLOGY

## 2019-02-02 ENCOUNTER — PATIENT MESSAGE (OUTPATIENT)
Dept: INTERNAL MEDICINE | Facility: CLINIC | Age: 84
End: 2019-02-02

## 2019-02-02 DIAGNOSIS — R93.89 ABNORMAL CHEST CT: ICD-10-CM

## 2019-02-02 DIAGNOSIS — Z12.11 SCREEN FOR COLON CANCER: Primary | ICD-10-CM

## 2019-02-04 ENCOUNTER — TELEPHONE (OUTPATIENT)
Dept: INTERNAL MEDICINE | Facility: CLINIC | Age: 84
End: 2019-02-04

## 2019-02-04 NOTE — TELEPHONE ENCOUNTER
----- Message from Sylvia Roper sent at 2/4/2019 11:59 AM CST -----  Contact: Lizandro (Daughter) [phone]  Lizandro would like to receive a call back in regards to receiving more information on the fit kit for the patient.

## 2019-03-06 ENCOUNTER — OFFICE VISIT (OUTPATIENT)
Dept: INTERNAL MEDICINE | Facility: CLINIC | Age: 84
End: 2019-03-06
Payer: MEDICARE

## 2019-03-06 ENCOUNTER — PATIENT MESSAGE (OUTPATIENT)
Dept: INTERNAL MEDICINE | Facility: CLINIC | Age: 84
End: 2019-03-06

## 2019-03-06 VITALS
TEMPERATURE: 98 F | HEART RATE: 72 BPM | BODY MASS INDEX: 20.19 KG/M2 | DIASTOLIC BLOOD PRESSURE: 68 MMHG | WEIGHT: 114 LBS | SYSTOLIC BLOOD PRESSURE: 139 MMHG

## 2019-03-06 DIAGNOSIS — I10 ESSENTIAL HYPERTENSION: ICD-10-CM

## 2019-03-06 DIAGNOSIS — R41.3 MEMORY LOSS: ICD-10-CM

## 2019-03-06 DIAGNOSIS — Z74.09 IMPAIRED FUNCTIONAL MOBILITY, BALANCE, GAIT, AND ENDURANCE: ICD-10-CM

## 2019-03-06 DIAGNOSIS — J02.9 SORE THROAT: Primary | ICD-10-CM

## 2019-03-06 DIAGNOSIS — G20.A1 IDIOPATHIC PARKINSON'S DISEASE: ICD-10-CM

## 2019-03-06 LAB — DEPRECATED S PYO AG THROAT QL EIA: NEGATIVE

## 2019-03-06 PROCEDURE — 1101F PR PT FALLS ASSESS DOC 0-1 FALLS W/OUT INJ PAST YR: ICD-10-PCS | Mod: HCNC,CPTII,S$GLB, | Performed by: INTERNAL MEDICINE

## 2019-03-06 PROCEDURE — 87081 CULTURE SCREEN ONLY: CPT | Mod: HCNC

## 2019-03-06 PROCEDURE — 99214 PR OFFICE/OUTPT VISIT, EST, LEVL IV, 30-39 MIN: ICD-10-PCS | Mod: HCNC,S$GLB,, | Performed by: INTERNAL MEDICINE

## 2019-03-06 PROCEDURE — 99999 PR PBB SHADOW E&M-EST. PATIENT-LVL III: CPT | Mod: PBBFAC,HCNC,, | Performed by: INTERNAL MEDICINE

## 2019-03-06 PROCEDURE — 1101F PT FALLS ASSESS-DOCD LE1/YR: CPT | Mod: HCNC,CPTII,S$GLB, | Performed by: INTERNAL MEDICINE

## 2019-03-06 PROCEDURE — 87880 STREP A ASSAY W/OPTIC: CPT | Mod: HCNC

## 2019-03-06 PROCEDURE — 99999 PR PBB SHADOW E&M-EST. PATIENT-LVL III: ICD-10-PCS | Mod: PBBFAC,HCNC,, | Performed by: INTERNAL MEDICINE

## 2019-03-06 PROCEDURE — 99214 OFFICE O/P EST MOD 30 MIN: CPT | Mod: HCNC,S$GLB,, | Performed by: INTERNAL MEDICINE

## 2019-03-06 RX ORDER — TRIAMTERENE AND HYDROCHLOROTHIAZIDE 37.5; 25 MG/1; MG/1
1 CAPSULE ORAL DAILY
Qty: 90 CAPSULE | Refills: 3 | Status: SHIPPED | OUTPATIENT
Start: 2019-03-06 | End: 2019-04-04

## 2019-03-06 RX ORDER — VERAPAMIL HYDROCHLORIDE 180 MG/1
TABLET, FILM COATED, EXTENDED RELEASE ORAL
Qty: 90 TABLET | Refills: 3 | Status: SHIPPED | OUTPATIENT
Start: 2019-03-06 | End: 2019-10-18 | Stop reason: SINTOL

## 2019-03-06 RX ORDER — PRAVASTATIN SODIUM 40 MG/1
40 TABLET ORAL DAILY
Qty: 90 TABLET | Refills: 4 | Status: SHIPPED | OUTPATIENT
Start: 2019-03-06 | End: 2019-09-27

## 2019-03-07 NOTE — PROGRESS NOTES
Subjective:       Patient ID: Gisselle Oseguera is a 88 y.o. female.    Chief Complaint: Annual Exam    HPI:  Patient here for follow-up of parkinsonism and she complains of a mild dry throat, mild sore throat.  Few family members have been sick but none with strep throat that they are aware.  We will assess this.  Parkinsonism is causing the most trouble she said she sees Neurology.  Also of note is that she was having some diarrhea and 1 family member wanted her to get a fit test but apparently that was not completed and the diarrhea has resolved.  She is having some lung nodules and masses evaluated and they do not think she would be a candidate for a colonoscopy so for now they wished to forego that test.  They are inquiring about home health and home PT since the patient's  .  She has excellent family support but does not drive and other than doctors visits is at home      Review of Systems   Constitutional: Negative for appetite change, chills and fever.   HENT: Positive for sore throat and voice change. Negative for nosebleeds and sinus pain.    Eyes: Negative for visual disturbance.   Respiratory: Negative for cough, shortness of breath and wheezing.    Cardiovascular: Negative for chest pain and leg swelling.   Gastrointestinal: Negative for abdominal pain, constipation and diarrhea.   Genitourinary: Negative for difficulty urinating and hematuria.   Musculoskeletal: Positive for gait problem. Negative for neck pain and neck stiffness.   Skin: Negative for pallor and rash.   Neurological: Positive for tremors and weakness. Negative for headaches.   Psychiatric/Behavioral: Negative for dysphoric mood and suicidal ideas. The patient is not nervous/anxious.        Objective:      Physical Exam   Constitutional: She is oriented to person, place, and time. She appears well-developed and well-nourished. No distress.   Frail-appearing female   HENT:   Head: Normocephalic and atraumatic.   Right Ear:  External ear normal.   Left Ear: External ear normal.   Mouth/Throat: Oropharynx is clear and moist. No oropharyngeal exudate.   Dry mouth but no redness or lesions   Eyes: Conjunctivae are normal. Pupils are equal, round, and reactive to light. No scleral icterus.   Neck: Normal range of motion. Neck supple. No thyromegaly present.   Cardiovascular: Normal rate and regular rhythm.   No murmur heard.  Pulmonary/Chest: Effort normal and breath sounds normal. She has no wheezes.   Abdominal: Soft. Bowel sounds are normal. She exhibits no distension. There is no tenderness.   Musculoskeletal: She exhibits no tenderness.   Lymphadenopathy:     She has no cervical adenopathy.   Neurological: She is alert and oriented to person, place, and time. She exhibits abnormal muscle tone. Coordination abnormal.   Skin: No rash noted.   Psychiatric: She has a normal mood and affect.   Vitals reviewed.      Assessment:       1. Sore throat    2. Idiopathic Parkinson's disease    3. Memory loss    4. Essential hypertension    5. Impaired functional mobility, balance, gait, and endurance        Plan:       Gisselle was seen today for annual exam.    Diagnoses and all orders for this visit:    Sore throat  -     Throat Screen, Rapid  -     Ambulatory referral to Home Health    Idiopathic Parkinson's disease  -     Ambulatory referral to Home Health    Memory loss  -     Ambulatory referral to Home Health    Essential hypertension  -     Ambulatory referral to Home Health    Impaired functional mobility, balance, gait, and endurance  -     Ambulatory referral to Home Health    Other orders  -     verapamil (CALAN-SR) 180 MG CR tablet; TAKE 1 TABLET ONE TIME DAILY  -     triamterene-hydrochlorothiazide 37.5-25 mg (DYAZIDE) 37.5-25 mg per capsule; Take 1 capsule by mouth once daily.  -     pravastatin (PRAVACHOL) 40 MG tablet; Take 1 tablet (40 mg total) by mouth once daily.  -     Strep A culture, throat

## 2019-03-08 LAB — BACTERIA THROAT CULT: NORMAL

## 2019-03-21 ENCOUNTER — OFFICE VISIT (OUTPATIENT)
Dept: PULMONOLOGY | Facility: CLINIC | Age: 84
End: 2019-03-21
Payer: MEDICARE

## 2019-03-21 ENCOUNTER — PATIENT MESSAGE (OUTPATIENT)
Dept: INTERNAL MEDICINE | Facility: CLINIC | Age: 84
End: 2019-03-21

## 2019-03-21 VITALS
SYSTOLIC BLOOD PRESSURE: 138 MMHG | BODY MASS INDEX: 19.77 KG/M2 | OXYGEN SATURATION: 95 % | HEART RATE: 57 BPM | HEIGHT: 63 IN | DIASTOLIC BLOOD PRESSURE: 80 MMHG | WEIGHT: 111.56 LBS

## 2019-03-21 DIAGNOSIS — I70.0 AORTIC ATHEROSCLEROSIS: ICD-10-CM

## 2019-03-21 DIAGNOSIS — R91.8 LUNG NODULES: Primary | ICD-10-CM

## 2019-03-21 PROCEDURE — 99999 PR PBB SHADOW E&M-EST. PATIENT-LVL III: CPT | Mod: PBBFAC,HCNC,, | Performed by: INTERNAL MEDICINE

## 2019-03-21 PROCEDURE — 99204 OFFICE O/P NEW MOD 45 MIN: CPT | Mod: HCNC,S$GLB,, | Performed by: INTERNAL MEDICINE

## 2019-03-21 PROCEDURE — 99999 PR PBB SHADOW E&M-EST. PATIENT-LVL III: ICD-10-PCS | Mod: PBBFAC,HCNC,, | Performed by: INTERNAL MEDICINE

## 2019-03-21 PROCEDURE — 1101F PR PT FALLS ASSESS DOC 0-1 FALLS W/OUT INJ PAST YR: ICD-10-PCS | Mod: HCNC,CPTII,S$GLB, | Performed by: INTERNAL MEDICINE

## 2019-03-21 PROCEDURE — 99204 PR OFFICE/OUTPT VISIT, NEW, LEVL IV, 45-59 MIN: ICD-10-PCS | Mod: HCNC,S$GLB,, | Performed by: INTERNAL MEDICINE

## 2019-03-21 PROCEDURE — 1101F PT FALLS ASSESS-DOCD LE1/YR: CPT | Mod: HCNC,CPTII,S$GLB, | Performed by: INTERNAL MEDICINE

## 2019-03-21 PROCEDURE — 99499 UNLISTED E&M SERVICE: CPT | Mod: HCNC,S$GLB,, | Performed by: INTERNAL MEDICINE

## 2019-03-21 PROCEDURE — 99499 RISK ADDL DX/OHS AUDIT: ICD-10-PCS | Mod: HCNC,S$GLB,, | Performed by: INTERNAL MEDICINE

## 2019-03-21 NOTE — PROGRESS NOTES
Subjective:       Patient ID: Gisselle Oseguera is a 88 y.o. female.    Chief Complaint: Abnormal Ct Scan    88 year old female with Parkinsonism and HTN who presents for evaluation of lung nodules. Patient has no respiratory complaints at this time.     Patient with CT chest in October 2018:   1.  Mixed density pulmonary nodule within the apical segment of the right upper lobe measuring 0.7 x 0.5 cm, which may correspond with a soft tissue nodule seen on chest radiograph 09/24/2018.  Additional opacity abutting the right minor fissure measuring 0.9 cm in long axis.  For a part solid nodule 6 mm or greater, Fleischner Society 2017 guidelines recommend follow up with non-contrast chest CT at 3-6 months to confirm persistence. If this nodule is unchanged and the solid component remains <6 mm, annual follow up CT should be performed for 5 years; however, persistence of a part-solid nodule with solid component ?6 mm should be considered highly suspicious and may warrant further workup with PET/CT, biopsy, or resection.    2.  Innumerable scattered pulmonary micronodules with the largest measuring 0.3 cm in the apicoposterior segment of the left upper lobe.  For multiple <6 mm sub solid nodules, Fleischner Society 2017 guidelines recommend short term follow up non-contrast chest CT in 3-6 months, as these are typically benign in nature (due to etiologies such as infection). If these findings persist at that time, additional follow up at 2 and 4 years after initial discovery should be considered in high risk patients to confirm absence of growth.    3.  Calcified granuloma within the lingula and multiple calcified mediastinal lymph nodes, compatible with old granulomatous disease.    CT in January with:     New right-sided sub solid opacities as well as stable sub solid and solid pulmonary opacities, as detailed above.  Etiology for the new opacities could be infectious or post inflammatory changes.  However, neoplastic  process is not entirely excluded.  Follow-up with noncontrast enhanced CT chest in 3-6 months (May 2019-2019).    Scattered pulmonary micronodules, unchanged. Please review the report of the study from 10/02/2018 for further details and recommendations.      Patient has a remote smoking history. < 20 pack/year.      Review of Systems   Constitutional: Positive for weight loss. Negative for activity change and appetite change.   HENT: Negative for postnasal drip and congestion.    Respiratory: Negative for sputum production, choking, shortness of breath, wheezing, dyspnea on extertion and Paroxysmal Nocturnal Dyspnea.    Cardiovascular: Negative for chest pain and leg swelling.   Musculoskeletal: Negative for joint swelling.   Skin: Negative for rash.   Neurological: Negative for dizziness.   Psychiatric/Behavioral: Negative for confusion.       Past Medical History:   Diagnosis Date    Arthritis     Basal cell carcinoma     Cataract     Depression     Fever blister     High cholesterol     HTN (hypertension) 2013    Hypertension     Memory loss     Parkinsonism      Past Surgical History:   Procedure Laterality Date    CATARACT EXTRACTION      COLONOSCOPY N/A 3/31/2014    Performed by Pepe Matos MD at [facility] (4[facility] F[facility])    HYSTERECTOMY      TONSILLECTOMY       Social History     Socioeconomic History    Marital status:      Spouse name: Not on file    Number of children: Not on file    Years of education: Not on file    Highest education level: Not on file   Social Needs    Financial resource strain: Not on file    Food insecurity - worry: Not on file    Food insecurity - inability: Not on file    Transportation needs - medical: Not on file    Transportation needs - non-medical: Not on file   Occupational History    Not on file   Tobacco Use    Smoking status: Former Smoker     Last attempt to quit: 8/3/1968     Years since quittin.6    Smokeless tobacco:  Never Used   Substance and Sexual Activity    Alcohol use: Yes     Comment: social    Drug use: No    Sexual activity: Not on file   Other Topics Concern    Are you pregnant or think you may be? No    Breast-feeding No   Social History Narrative    Not on file     Family History   Problem Relation Age of Onset    Cancer Mother 80        colon    Macular degeneration Paternal Aunt     No Known Problems Father     No Known Problems Sister     No Known Problems Brother     No Known Problems Maternal Aunt     No Known Problems Maternal Uncle     No Known Problems Paternal Uncle     No Known Problems Maternal Grandmother     No Known Problems Maternal Grandfather     No Known Problems Paternal Grandmother     No Known Problems Paternal Grandfather     Melanoma Neg Hx     Blindness Neg Hx     Glaucoma Neg Hx     Retinal detachment Neg Hx     Amblyopia Neg Hx     Cataracts Neg Hx     Diabetes Neg Hx     Hypertension Neg Hx     Strabismus Neg Hx     Stroke Neg Hx     Thyroid disease Neg Hx      Social History     Socioeconomic History    Marital status:      Spouse name: Not on file    Number of children: Not on file    Years of education: Not on file    Highest education level: Not on file   Social Needs    Financial resource strain: Not on file    Food insecurity - worry: Not on file    Food insecurity - inability: Not on file    Transportation needs - medical: Not on file    Transportation needs - non-medical: Not on file   Occupational History    Not on file   Tobacco Use    Smoking status: Former Smoker     Last attempt to quit: 8/3/1968     Years since quittin.6    Smokeless tobacco: Never Used   Substance and Sexual Activity    Alcohol use: Yes     Comment: social    Drug use: No    Sexual activity: Not on file   Other Topics Concern    Are you pregnant or think you may be? No    Breast-feeding No   Social History Narrative    Not on file       Objective:       Physical Exam   Constitutional: She is oriented to person, place, and time. She appears well-developed and well-nourished.   HENT:   Head: Normocephalic.   Nose: Nose normal.   Neck: Normal range of motion. Neck supple.   Cardiovascular: Normal rate and regular rhythm.   Pulmonary/Chest: Normal expansion and symmetric chest wall expansion.   Abdominal: Soft.   Musculoskeletal: She exhibits no edema.   Neurological: She is alert and oriented to person, place, and time.   Skin: Skin is warm and dry. She is not diaphoretic.   Psychiatric: She has a normal mood and affect. Her behavior is normal.   Nursing note and vitals reviewed.    Personal Diagnostic Review  CT of chest in [facility]  No flowsheet data found.      Assessment:       1. Lung nodules        Outpatient Encounter Medications as of 3/21/2019   Medication Sig Dispense Refill    ascorbic acid, vitamin C, (VITAMIN C) 500 MG tablet Take 500 mg by mouth once daily.      aspirin (ECOTRIN) 81 MG EC tablet Take 81 mg by mouth once daily.      calcium-vitamin D3 500 mg(1,250mg) -200 unit per tablet Take 1 tablet by mouth 2 (two) times daily with meals.      carbidopa-levodopa  mg (SINEMET)  mg per tablet Half tablet every hour 1260 tablet 3    citalopram (CELEXA) 10 MG tablet TAKE 1 TABLET ONE TIME DAILY 90 tablet 3    DOCUSATE CALCIUM (STOOL SOFTENER ORAL)       influenza (FLUZONE HIGH-DOSE) 180 mcg/0.5 mL vaccine Inject into the muscle. 0.5 mL 0    multivitamin with minerals tablet       pravastatin (PRAVACHOL) 40 MG tablet Take 1 tablet (40 mg total) by mouth once daily. 90 tablet 4    triamterene-hydrochlorothiazide 37.5-25 mg (DYAZIDE) 37.5-25 mg per capsule Take 1 capsule by mouth once daily. 90 capsule 3    verapamil (CALAN-SR) 180 MG CR tablet TAKE 1 TABLET ONE TIME DAILY 90 tablet 3    vitamin A palmitate-vitamin D2 10,000-400 unit Tab       donepezil (ARICEPT) 10 MG tablet Take 1 tablet (10 mg total) by mouth every evening. (Patient  taking differently: Take 5 mg by mouth every evening. ) 30 tablet 11     No facility-administered encounter medications on file as of 3/21/2019.      Orders Placed This Encounter   Procedures    CT Chest Without Contrast     Standing Status:   Future     Standing Expiration Date:   3/21/2020     Order Specific Question:   May the Radiologist modify the order per protocol to meet the clinical needs of the patient?     Answer:   Yes       Plan:       Aortic atherosclerosis  On statin    Lung nodules  Patient with multiple nodules which are unchanged and with new nodules in CT performed in January. Low risk for malignancy.   Patient is debilitated and likely would not be a candidate for therapy if malignancy.   Consider swallow evaluation for aspiration.   Recommend repeat CT chest in 3 months.     Follow up in 3 months with CT chest.     Kassy Adrian MD

## 2019-03-21 NOTE — LETTER
March 21, 2019      Philip Karimi MD  [address]  [place] [address]           [facility] - Pulmonary Services  [address]  [facility] [address]  Phone: [phone]          Patient: Gisselle Oseguera   MR Number: [id number]   YOB: 1931   Date of Visit: 3/21/2019       Dear Dr. Philip Karimi:    Thank you for referring Gsiselle Oseguera to me for evaluation. Attached you will find relevant portions of my assessment and plan of care.    If you have questions, please do not hesitate to call me. I look forward to following Gisselle Oseguera along with you.    Sincerely,    Kassy Adrian MD    Enclosure  CC:  No Recipients    If you would like to receive this communication electronically, please contact [email or web address] or [phone] to request more information on [employer] Link access.    For providers and/or their staff who would like to refer a patient to [facility], please contact us through our one-stop-shop provider referral line, [facility], at [phone].    If you feel you have received this communication in error or would no longer like to receive these types of communications, please e-mail [email or web address]

## 2019-03-21 NOTE — ASSESSMENT & PLAN NOTE
Patient with multiple nodules which are unchanged and with new nodules in CT performed in January. Low risk for malignancy.   Patient is debilitated and likely would not be a candidate for therapy if malignancy.   Consider swallow evaluation for aspiration.   Recommend repeat CT chest in 3 months.

## 2019-03-29 ENCOUNTER — PATIENT MESSAGE (OUTPATIENT)
Dept: INTERNAL MEDICINE | Facility: CLINIC | Age: 84
End: 2019-03-29

## 2019-03-29 ENCOUNTER — TELEPHONE (OUTPATIENT)
Dept: NEUROLOGY | Facility: CLINIC | Age: 84
End: 2019-03-29

## 2019-03-29 NOTE — TELEPHONE ENCOUNTER
Called and spoke with Meri patient's sitter as patient daughter not at home.     She reports they bought a new blood pressure device and BP readings 150/70s. She is not taking the verapamil. She believes the old device was giving erroneous readings (80/40). Advised to continue to monitor BP     Called daughter sarah Delgado at number provider by david mcmullen no answer.

## 2019-03-29 NOTE — TELEPHONE ENCOUNTER
----- Message from Darwin Valdes sent at 3/29/2019 11:13 AM CDT -----  Contact: self  Type:  Sooner Apoointment Request    Caller is requesting a sooner appointment.  Caller declined first available appointment listed below.  Caller will not accept being placed on the waitlist and is requesting a message be sent to doctor.  Name of Caller:Pt  When is the first available appointment? n/a  Symptoms:check-up   Would the patient rather a call back or a response via [facility]? Call back  Best Call Back Number: [phone]  Additional Information: pt has recall for April 2019, pt also has been having low blood pressure

## 2019-04-03 ENCOUNTER — PATIENT MESSAGE (OUTPATIENT)
Dept: NEUROLOGY | Facility: CLINIC | Age: 84
End: 2019-04-03

## 2019-04-03 ENCOUNTER — TELEPHONE (OUTPATIENT)
Dept: INTERNAL MEDICINE | Facility: CLINIC | Age: 84
End: 2019-04-03

## 2019-04-03 NOTE — TELEPHONE ENCOUNTER
----- Message from Jose Granda sent at 4/3/2019  2:25 PM CDT -----  Contact: Eugene/ Lizandro [phone]  Medical advice    The patient has been having fainting spells for the past week and she is seemingly more disoriented and today she had a moment of about 5 minutes when she stared with a fixed looked in her eyes.    I advised her to call the Nurse on Call when she is with the patient. She wasn't with the patient at this time.

## 2019-04-04 ENCOUNTER — HOSPITAL ENCOUNTER (OUTPATIENT)
Dept: RADIOLOGY | Facility: HOSPITAL | Age: 84
Discharge: HOME | End: 2019-04-04
Attending: PHYSICIAN ASSISTANT
Payer: MEDICARE

## 2019-04-04 ENCOUNTER — OFFICE VISIT (OUTPATIENT)
Dept: INTERNAL MEDICINE | Facility: CLINIC | Age: 84
End: 2019-04-04
Payer: MEDICARE

## 2019-04-04 VITALS
SYSTOLIC BLOOD PRESSURE: 112 MMHG | BODY MASS INDEX: 19.13 KG/M2 | DIASTOLIC BLOOD PRESSURE: 62 MMHG | HEART RATE: 50 BPM | WEIGHT: 108 LBS | OXYGEN SATURATION: 97 %

## 2019-04-04 DIAGNOSIS — R53.83 FATIGUE, UNSPECIFIED TYPE: Primary | ICD-10-CM

## 2019-04-04 DIAGNOSIS — G20.A1 IDIOPATHIC PARKINSON'S DISEASE: ICD-10-CM

## 2019-04-04 DIAGNOSIS — R41.0 CONFUSION: ICD-10-CM

## 2019-04-04 PROCEDURE — 70450 CT HEAD WITHOUT CONTRAST: ICD-10-PCS | Mod: 26,HCNC,, | Performed by: RADIOLOGY

## 2019-04-04 PROCEDURE — 1101F PT FALLS ASSESS-DOCD LE1/YR: CPT | Mod: HCNC,CPTII,S$GLB, | Performed by: PHYSICIAN ASSISTANT

## 2019-04-04 PROCEDURE — 70450 CT HEAD/BRAIN W/O DYE: CPT | Mod: TC,HCNC

## 2019-04-04 PROCEDURE — 99214 PR OFFICE/OUTPT VISIT, EST, LEVL IV, 30-39 MIN: ICD-10-PCS | Mod: HCNC,S$GLB,, | Performed by: PHYSICIAN ASSISTANT

## 2019-04-04 PROCEDURE — 99999 PR PBB SHADOW E&M-EST. PATIENT-LVL III: CPT | Mod: PBBFAC,HCNC,, | Performed by: PHYSICIAN ASSISTANT

## 2019-04-04 PROCEDURE — 99999 PR PBB SHADOW E&M-EST. PATIENT-LVL III: ICD-10-PCS | Mod: PBBFAC,HCNC,, | Performed by: PHYSICIAN ASSISTANT

## 2019-04-04 PROCEDURE — 70450 CT HEAD/BRAIN W/O DYE: CPT | Mod: 26,HCNC,, | Performed by: RADIOLOGY

## 2019-04-04 PROCEDURE — 1101F PR PT FALLS ASSESS DOC 0-1 FALLS W/OUT INJ PAST YR: ICD-10-PCS | Mod: HCNC,CPTII,S$GLB, | Performed by: PHYSICIAN ASSISTANT

## 2019-04-04 PROCEDURE — 99214 OFFICE O/P EST MOD 30 MIN: CPT | Mod: HCNC,S$GLB,, | Performed by: PHYSICIAN ASSISTANT

## 2019-04-04 NOTE — PROGRESS NOTES
Subjective:       Patient ID: Gisselle Oseguera is a 88 y.o. female.    Chief Complaint: Blood Pressure Check; Fatigue; and Mental Health Problem (Confusion)    HPI   Established pt of Philip Karimi MD (new to me)    Presents to clinic with her son and daughter.     Family concerned as patient has been more confused, talking less, and a bit more fatigue over the past week. For instance, grabbed salt shaker attempting to eat it earlier this week. Slow to answer some questions. Family reports she is sleeping well, eating/drinking well, good appetite. There have been some blood pressure flucuations at home but thinks this may have been related to BP cuff. /62 today in clinic. tacking verapamil 180mg. Upon questioning patient, she is without complaints. Denies pain, dysuria, hematuria, ha, cp, sob. No recent falls.     Review of patient's allergies indicates:  No Known Allergies    Past Medical History:   Diagnosis Date    Arthritis     Basal cell carcinoma     Cataract     Depression     Fever blister     High cholesterol     HTN (hypertension) 2013    Hypertension     Memory loss     Parkinsonism      Patient Active Problem List   Diagnosis    Memory loss    Idiopathic Parkinson's disease    Parkinsonism    HTN (hypertension)    Central retinal vein occlusion, left eye    Choroidal nevus of right eye    Dry eye syndrome    Posterior vitreous detachment, both eyes    Special screening for malignant neoplasms, colon    Syncope    Aortic atherosclerosis    Muscle weakness (generalized)    Impaired functional mobility, balance, gait, and endurance    Poor posture    Decreased coordination    Impaired motor control    Lung nodules     Social History     Tobacco Use    Smoking status: Former Smoker     Last attempt to quit: 8/3/1968     Years since quittin.7    Smokeless tobacco: Never Used   Substance Use Topics    Alcohol use: Yes     Comment: social    Drug use: No        Current Outpatient Medications:     ascorbic acid, vitamin C, (VITAMIN C) 500 MG tablet, Take 500 mg by mouth once daily., Disp: , Rfl:     aspirin (ECOTRIN) 81 MG EC tablet, Take 81 mg by mouth once daily., Disp: , Rfl:     calcium-vitamin D3 500 mg(1,250mg) -200 unit per tablet, Take 1 tablet by mouth 2 (two) times daily with meals., Disp: , Rfl:     carbidopa-levodopa  mg (SINEMET)  mg per tablet, Half tablet every hour, Disp: 1260 tablet, Rfl: 3    citalopram (CELEXA) 10 MG tablet, TAKE 1 TABLET ONE TIME DAILY, Disp: 90 tablet, Rfl: 3    DOCUSATE CALCIUM (STOOL SOFTENER ORAL), , Disp: , Rfl:     influenza (FLUZONE HIGH-DOSE) 180 mcg/0.5 mL vaccine, Inject into the muscle., Disp: 0.5 mL, Rfl: 0    multivitamin with minerals tablet, , Disp: , Rfl:     pravastatin (PRAVACHOL) 40 MG tablet, Take 1 tablet (40 mg total) by mouth once daily., Disp: 90 tablet, Rfl: 4    verapamil (CALAN-SR) 180 MG CR tablet, TAKE 1 TABLET ONE TIME DAILY, Disp: 90 tablet, Rfl: 3    vitamin A palmitate-vitamin D2 10,000-400 unit Tab, , Disp: , Rfl:     donepezil (ARICEPT) 10 MG tablet, Take 1 tablet (10 mg total) by mouth every evening. (Patient taking differently: Take 5 mg by mouth every evening. ), Disp: 30 tablet, Rfl: 11        Review of Systems   Constitutional: Positive for fatigue. Negative for appetite change, chills, diaphoresis, fever and unexpected weight change.   Respiratory: Negative for cough and shortness of breath.    Cardiovascular: Negative for chest pain and leg swelling.   Gastrointestinal: Negative for abdominal pain, constipation, diarrhea, nausea and vomiting.   Genitourinary: Negative for difficulty urinating, dysuria and hematuria.   Skin: Negative for rash.   Neurological: Positive for tremors. Negative for weakness and headaches.   Psychiatric/Behavioral: Positive for confusion. Negative for agitation, hallucinations and sleep disturbance.       Objective /62   Pulse (!) 50    Wt 49 kg (108 lb)   SpO2 97%   BMI 19.13 kg/m²         Physical Exam   Constitutional: She appears well-developed and well-nourished. No distress.   HENT:   Head: Normocephalic and atraumatic.   Mouth/Throat: Oropharynx is clear and moist.   Neck: No thyromegaly present.   Cardiovascular: Normal rate and regular rhythm. Exam reveals no friction rub.   No murmur heard.  Pulmonary/Chest: Effort normal and breath sounds normal. She has no wheezes. She has no rales.   Abdominal: Soft. Bowel sounds are normal. There is no tenderness.   Lymphadenopathy:     She has no cervical adenopathy.   Neurological: She is alert.   Oriented to name and place.   Able to identify her son and daughter in exam room  resting tremor,   Cogwheel rigidity   Skin: Skin is warm and dry. Capillary refill takes less than 2 seconds. No rash noted.   Vitals reviewed.          Assessment:       1. Fatigue, unspecified type    2. Idiopathic Parkinson's disease    3. Confusion        Plan:         Gisselle was seen today for blood pressure check, fatigue and mental health problem.    Diagnoses and all orders for this visit:    Fatigue, unspecified type  -     CBC auto differential; Future  -     Cancel: Basic metabolic panel; Future  -     TSH; Future  -     POCT URINALYSIS  -     Urinalysis; Future  -     Cancel: Urine culture; Future  -     Cancel: Urine culture  -     Urinalysis  -     Comprehensive metabolic panel; Future    Idiopathic Parkinson's disease  -     CBC auto differential; Future  -     Cancel: Basic metabolic panel; Future  -     TSH; Future  -     POCT URINALYSIS  -     Urinalysis; Future  -     Cancel: Urine culture; Future  -     Cancel: Urine culture  -     Urinalysis  -     Comprehensive metabolic panel; Future    Confusion  -     CBC auto differential; Future  -     Cancel: Basic metabolic panel; Future  -     TSH; Future  -     POCT URINALYSIS  -     Urinalysis; Future  -     Cancel: Urine culture; Future  -     Cancel:  Urine culture  -     Urinalysis  -     Comprehensive metabolic panel; Future  -     CT Head Without Contrast; Future      Stat lab and imaging as above.   Suspect this maybe parkinsonism related but will obtain lab as above to rule out acute illness  Pt seen and discussed with Dr. Sachi Aguilar PA-C    Addendum:      Chemistry        Component Value Date/Time     04/04/2019 1307    K 3.9 04/04/2019 1307     04/04/2019 1307    CO2 27 04/04/2019 1307    BUN 19 04/04/2019 1307    CREATININE 0.8 04/04/2019 1307    GLU 80 04/04/2019 1307        Component Value Date/Time    CALCIUM 9.7 04/04/2019 1307    ALKPHOS 68 04/04/2019 1307    AST 9 (L) 04/04/2019 1307    ALT <5 (L) 04/04/2019 1307    BILITOT 0.6 04/04/2019 1307    ESTGFRAFRICA >60 04/04/2019 1307    EGFRNONAA >60 04/04/2019 1307        Lab Results   Component Value Date    WBC 7.84 04/04/2019    HGB 12.1 04/04/2019    HCT 37.6 04/04/2019    MCV 95 04/04/2019     04/04/2019     CT Head Without Contrast  Narrative: EXAMINATION:  CT HEAD WITHOUT CONTRAST    CLINICAL HISTORY:  confusion; Disorientation, unspecified    TECHNIQUE:  Low dose axial CT images obtained throughout the head without the use of intravenous contrast.  Axial, sagittal and coronal reconstructions were performed.    COMPARISON:  None.    FINDINGS:  Intracranial compartment:    Ventricles and sulci are normal in size for age without evidence of hydrocephalus.    Mild chronic microvascular ischemic disease in the supratentorial white matter.  No parenchymal mass, hemorrhage, edema or major vascular distribution infarct.    No extra-axial blood or fluid collections.    Mild atherosclerotic calcification about the skull base.    Skull/extracranial contents (limited evaluation):    No fracture. Mastoid air cells and paranasal sinuses are essentially clear.    Degenerative change of the bilateral TMJs.  Impression: No evidence of acute hemorrhage or major vascular  distribution infarct.    Mild chronic microvascular ischemic disease.    Electronically signed by: Black Palacios MD  Date:    04/04/2019  Time:    14:02    Lab/imaging unremarkable for acute illness  Recommend Neurology follow up next week if possible  Daughter plans to call and schedule  RTC/ED precautions discussed.       Reema Aguilar PA-C

## 2019-04-05 ENCOUNTER — TELEPHONE (OUTPATIENT)
Dept: INTERNAL MEDICINE | Facility: CLINIC | Age: 84
End: 2019-04-05

## 2019-04-05 ENCOUNTER — LAB VISIT (OUTPATIENT)
Dept: LAB | Facility: HOSPITAL | Age: 84
End: 2019-04-05
Payer: MEDICARE

## 2019-04-05 DIAGNOSIS — R41.0 CONFUSION: ICD-10-CM

## 2019-04-05 DIAGNOSIS — R53.83 FATIGUE, UNSPECIFIED TYPE: ICD-10-CM

## 2019-04-05 DIAGNOSIS — G20.A1 IDIOPATHIC PARKINSON'S DISEASE: ICD-10-CM

## 2019-04-05 LAB
BILIRUB UR QL STRIP: NEGATIVE
CLARITY UR REFRACT.AUTO: CLEAR
COLOR UR AUTO: NORMAL
GLUCOSE UR QL STRIP: NEGATIVE
HGB UR QL STRIP: NEGATIVE
KETONES UR QL STRIP: NEGATIVE
LEUKOCYTE ESTERASE UR QL STRIP: NEGATIVE
NITRITE UR QL STRIP: NEGATIVE
PH UR STRIP: 8 [PH] (ref 5–8)
PROT UR QL STRIP: NEGATIVE
SP GR UR STRIP: 1.01 (ref 1–1.03)
URN SPEC COLLECT METH UR: NORMAL

## 2019-04-05 PROCEDURE — 87086 URINE CULTURE/COLONY COUNT: CPT | Mod: HCNC

## 2019-04-05 PROCEDURE — 81003 URINALYSIS AUTO W/O SCOPE: CPT | Mod: HCNC

## 2019-04-05 RX ORDER — CARBIDOPA AND LEVODOPA 25; 100 MG/1; MG/1
TABLET ORAL
Qty: 1260 TABLET | Refills: 3 | Status: SHIPPED | OUTPATIENT
Start: 2019-04-05 | End: 2019-09-04 | Stop reason: SDUPTHER

## 2019-04-05 NOTE — TELEPHONE ENCOUNTER
----- Message from Spring Mejia sent at 4/5/2019  1:49 PM CDT -----  Gisselle mills [id number]; urine order needs to put in as a home collect  Thanks spring [phone]

## 2019-04-05 NOTE — TELEPHONE ENCOUNTER
----- Message from Symone Kelly sent at 4/5/2019  8:36 AM CDT -----  Patient was unable to collect urine yesterday, she dropped off urine today, there are No orders to process.

## 2019-04-06 ENCOUNTER — PATIENT MESSAGE (OUTPATIENT)
Dept: INTERNAL MEDICINE | Facility: CLINIC | Age: 84
End: 2019-04-06

## 2019-04-07 ENCOUNTER — PATIENT MESSAGE (OUTPATIENT)
Dept: NEUROLOGY | Facility: CLINIC | Age: 84
End: 2019-04-07

## 2019-04-07 LAB
BACTERIA UR CULT: NORMAL
BACTERIA UR CULT: NORMAL

## 2019-04-08 ENCOUNTER — TELEPHONE (OUTPATIENT)
Dept: NEUROLOGY | Facility: CLINIC | Age: 84
End: 2019-04-08

## 2019-04-08 NOTE — TELEPHONE ENCOUNTER
Faxed form to Gene and requested she fill out but do NOT have patient fill form out until she presents to an [facility] facility so it can be witnessed and signed by an [facility] employee.

## 2019-04-08 NOTE — TELEPHONE ENCOUNTER
----- Message from Paddy Peck sent at 4/8/2019  2:43 PM CDT -----  Needs Advice    Reason for call: Andrea is calling to schedule recall dated 04/24/19        Communication Preference: Gene (daughter) @ [phone]    Additional Information:

## 2019-04-09 ENCOUNTER — TELEPHONE (OUTPATIENT)
Dept: NEUROLOGY | Facility: CLINIC | Age: 84
End: 2019-04-09

## 2019-04-10 NOTE — TELEPHONE ENCOUNTER
----- Message from Niko Mcbride sent at 4/9/2019  4:15 PM CDT -----  Contact: Daughter/ [phone]  Patient daughter would like a call back to make a appt for her mother.

## 2019-04-25 ENCOUNTER — TELEPHONE (OUTPATIENT)
Dept: NEUROLOGY | Facility: CLINIC | Age: 84
End: 2019-04-25

## 2019-05-02 ENCOUNTER — OFFICE VISIT (OUTPATIENT)
Dept: NEUROLOGY | Facility: CLINIC | Age: 84
End: 2019-05-02
Payer: MEDICARE

## 2019-05-02 VITALS — HEIGHT: 63 IN | WEIGHT: 115 LBS | BODY MASS INDEX: 20.38 KG/M2

## 2019-05-02 DIAGNOSIS — G20.A1 PARKINSON DISEASE: Primary | ICD-10-CM

## 2019-05-02 PROCEDURE — 99999 PR PBB SHADOW E&M-EST. PATIENT-LVL III: CPT | Mod: PBBFAC,HCNC,, | Performed by: PSYCHIATRY & NEUROLOGY

## 2019-05-02 PROCEDURE — 99499 RISK ADDL DX/OHS AUDIT: ICD-10-PCS | Mod: HCNC,S$GLB,, | Performed by: PSYCHIATRY & NEUROLOGY

## 2019-05-02 PROCEDURE — 99499 UNLISTED E&M SERVICE: CPT | Mod: HCNC,S$GLB,, | Performed by: PSYCHIATRY & NEUROLOGY

## 2019-05-02 PROCEDURE — 99999 PR PBB SHADOW E&M-EST. PATIENT-LVL III: ICD-10-PCS | Mod: PBBFAC,HCNC,, | Performed by: PSYCHIATRY & NEUROLOGY

## 2019-05-02 PROCEDURE — 99214 PR OFFICE/OUTPT VISIT, EST, LEVL IV, 30-39 MIN: ICD-10-PCS | Mod: HCNC,S$GLB,, | Performed by: PSYCHIATRY & NEUROLOGY

## 2019-05-02 PROCEDURE — 1101F PT FALLS ASSESS-DOCD LE1/YR: CPT | Mod: HCNC,CPTII,S$GLB, | Performed by: PSYCHIATRY & NEUROLOGY

## 2019-05-02 PROCEDURE — 1101F PR PT FALLS ASSESS DOC 0-1 FALLS W/OUT INJ PAST YR: ICD-10-PCS | Mod: HCNC,CPTII,S$GLB, | Performed by: PSYCHIATRY & NEUROLOGY

## 2019-05-02 PROCEDURE — 99214 OFFICE O/P EST MOD 30 MIN: CPT | Mod: HCNC,S$GLB,, | Performed by: PSYCHIATRY & NEUROLOGY

## 2019-05-02 NOTE — PROGRESS NOTES
"Name: Gisselle Oseguera  MRN: [id number]   CSN: [id number]      Date: 05/02/2019    Chief Complaint:   - still a few halluciantions  - no new falls  - help from family  - GI is ok  - appettie has recovered some  - might be near pasing out when stands up  - mood is OK, but unclear if citalopram has done anything previiusly    From Oct 2018:  - hallucinations are still there at night, some stacking  - taking her meds every hour on the 1/2 hour  - says her routine is about the same  - had some spell of low appetite, lost 10-15 lbs over three months  - had some GI upset  -  -    From July 2018:  - tremor is a little worse overall  - chronic LBP now, worse in the afternoon - hard to describe, but severe, responds to CD/LD.  - now taking 1/2 tab every hour  - naps help in the afternoon  - some walking for 1/2 mile over 30 minutes or so  - interested in PT    From April 2018:  - still hallucinations but a bit less  - more trouble tolreating donepezil at night  - willing to take in am  - poor sleep    From 1/2018  - still some hallucinations and no benefit of nuplazid  - memory likely a bit worse  - gait unstable     From October 2017:  - worried about nuplazid  - some persistent hallucniations  - memory is holding up now  - no falls but risk  - bowels stable  - bp under control    From March 2017  1) Never tried the sleep proposal below (melatonin in PM and coffee in AM) - will do now  2) Gait is stable  3) Hallucinations and delusions still exist    History of Present Illness (HPI):  Tremor dominant PD x 10+ years, L>R, started on Requip.  Then added Sinemet.  Did well for a long time.  Now having more issues with tremor breaking through.  Amantadine caused confusion and dizzy/fainting.  Not sure about Axilect.    "Fights constipation", no hyposmia, sleeps well at night (rare dreams, no RBD), no depression or anxiety - but started Lexapro to "take the edge off".  Doesn't think the tremors got worse.    ROS:  Review of Systems "   Constitutional: Negative for malaise/fatigue and weight loss.   HENT: Negative for hearing loss.    Eyes: Negative for blurred vision and double vision.   Respiratory: Negative for shortness of breath and stridor.    Cardiovascular: Negative for chest pain and palpitations.   Gastrointestinal: Positive for constipation. Negative for nausea and vomiting.   Genitourinary: Negative for frequency and urgency.   Musculoskeletal: Negative for falls and myalgias.   Skin: Negative for rash.   Neurological: Positive for tremors. Negative for focal weakness and seizures.   Endo/Heme/Allergies: Does not bruise/bleed easily.   Psychiatric/Behavioral: Negative for depression, hallucinations and memory loss. The patient is not nervous/anxious.      Past Medical History: The patient  has a past medical history of Arthritis, Basal cell carcinoma, Cataract, Depression, Fever blister, High cholesterol, HTN (hypertension) (6/27/2013), Hypertension, Memory loss, and Parkinsonism.    Social History: The patient  reports that she quit smoking about 50 years ago. She has never used smokeless tobacco. She reports that she drinks alcohol. She reports that she does not use drugs.    Family History: Their family history includes Cancer (age of onset: 80) in her mother; Macular degeneration in her paternal aunt; No Known Problems in her brother, father, maternal aunt, maternal grandfather, maternal grandmother, maternal uncle, paternal grandfather, paternal grandmother, paternal uncle, and sister.    Allergies: Patient has no known allergies.     Meds:   Current Outpatient Medications on File Prior to Visit   Medication Sig Dispense Refill    ascorbic acid, vitamin C, (VITAMIN C) 500 MG tablet Take 500 mg by mouth once daily.      aspirin (ECOTRIN) 81 MG EC tablet Take 81 mg by mouth once daily.      calcium-vitamin D3 500 mg(1,250mg) -200 unit per tablet Take 1 tablet by mouth 2 (two) times daily with meals.      carbidopa-levodopa  " mg (SINEMET)  mg per tablet Half tablet every hour 1260 tablet 3    citalopram (CELEXA) 10 MG tablet TAKE 1 TABLET ONE TIME DAILY 90 tablet 3    donepezil (ARICEPT) 10 MG tablet Take 1 tablet (10 mg total) by mouth every evening. (Patient taking differently: Take 5 mg by mouth every evening. ) 30 tablet 11    multivitamin with minerals tablet       pravastatin (PRAVACHOL) 40 MG tablet Take 1 tablet (40 mg total) by mouth once daily. 90 tablet 4    verapamil (CALAN-SR) 180 MG CR tablet TAKE 1 TABLET ONE TIME DAILY 90 tablet 3    DOCUSATE CALCIUM (STOOL SOFTENER ORAL)       influenza (FLUZONE HIGH-DOSE) 180 mcg/0.5 mL vaccine Inject into the muscle. 0.5 mL 0    vitamin A palmitate-vitamin D2 10,000-400 unit Tab        No current facility-administered medications on file prior to visit.        Exam:  Ht 5' 3" (1.6 m)   Wt 52.2 kg (115 lb)   BMI 20.37 kg/m²     * Specialized movement exam  Hypophonic speech.    Moderate facial masking.   L>R moderate CW and mild rigidity.   L>>R resting tremor, some re-emergence with posture, + chin tremor.   Mild choreic dyskinesia only, no dystonia.   Gait is stooped, limited arm swing, mild postural instability.      Laboratory/Radiological:  - Results: none to review    - Independent review of images: none to review    Diagnoses:          1) PD - tremor dominant.   2) Parkinson's induced psychosis  3) Sleep dyscrasia - circ rhthym disorder    Medical Decision Making:  - continue donepezil as your are  - may lower the carbidopa/levodopa when her BP is low  - wean off the citalopram by taking every other night for 2 weeks, then off  - tilt table test to be scheduled for BP issues          Jayjay Quinones MD, MPH  Division of Movement and Memory Disorders  [facility]  [phone]        "

## 2019-05-02 NOTE — PATIENT INSTRUCTIONS
- continue donepezil as your are  - may lower the carbidopa/levodopa when her BP is low  - wean off the citalopram by taking every other night for 2 weeks, then off  - tilt table test to be scheduled for BP issues

## 2019-05-20 ENCOUNTER — PATIENT MESSAGE (OUTPATIENT)
Dept: NEUROLOGY | Facility: CLINIC | Age: 84
End: 2019-05-20

## 2019-06-13 ENCOUNTER — PES CALL (OUTPATIENT)
Dept: ADMINISTRATIVE | Facility: CLINIC | Age: 84
End: 2019-06-13

## 2019-06-21 ENCOUNTER — HOSPITAL ENCOUNTER (OUTPATIENT)
Dept: RADIOLOGY | Facility: HOSPITAL | Age: 84
Discharge: HOME | End: 2019-06-21
Attending: INTERNAL MEDICINE
Payer: MEDICARE

## 2019-06-21 ENCOUNTER — OFFICE VISIT (OUTPATIENT)
Dept: PULMONOLOGY | Facility: CLINIC | Age: 84
End: 2019-06-21
Payer: MEDICARE

## 2019-06-21 VITALS
HEART RATE: 101 BPM | DIASTOLIC BLOOD PRESSURE: 76 MMHG | WEIGHT: 112.44 LBS | OXYGEN SATURATION: 93 % | HEIGHT: 63 IN | SYSTOLIC BLOOD PRESSURE: 140 MMHG | BODY MASS INDEX: 19.92 KG/M2

## 2019-06-21 DIAGNOSIS — R91.8 LUNG NODULES: ICD-10-CM

## 2019-06-21 PROCEDURE — 1101F PR PT FALLS ASSESS DOC 0-1 FALLS W/OUT INJ PAST YR: ICD-10-PCS | Mod: HCNC,CPTII,S$GLB, | Performed by: INTERNAL MEDICINE

## 2019-06-21 PROCEDURE — 99213 PR OFFICE/OUTPT VISIT, EST, LEVL III, 20-29 MIN: ICD-10-PCS | Mod: HCNC,S$GLB,, | Performed by: INTERNAL MEDICINE

## 2019-06-21 PROCEDURE — 71250 CT THORAX DX C-: CPT | Mod: 26,HCNC,, | Performed by: RADIOLOGY

## 2019-06-21 PROCEDURE — 99999 PR PBB SHADOW E&M-EST. PATIENT-LVL III: CPT | Mod: PBBFAC,HCNC,, | Performed by: INTERNAL MEDICINE

## 2019-06-21 PROCEDURE — 1101F PT FALLS ASSESS-DOCD LE1/YR: CPT | Mod: HCNC,CPTII,S$GLB, | Performed by: INTERNAL MEDICINE

## 2019-06-21 PROCEDURE — 71250 CT THORAX DX C-: CPT | Mod: TC,HCNC

## 2019-06-21 PROCEDURE — 71250 CT CHEST WITHOUT CONTRAST: ICD-10-PCS | Mod: 26,HCNC,, | Performed by: RADIOLOGY

## 2019-06-21 PROCEDURE — 99999 PR PBB SHADOW E&M-EST. PATIENT-LVL III: ICD-10-PCS | Mod: PBBFAC,HCNC,, | Performed by: INTERNAL MEDICINE

## 2019-06-21 PROCEDURE — 99499 RISK ADDL DX/OHS AUDIT: ICD-10-PCS | Mod: HCNC,S$GLB,, | Performed by: INTERNAL MEDICINE

## 2019-06-21 PROCEDURE — 99213 OFFICE O/P EST LOW 20 MIN: CPT | Mod: HCNC,S$GLB,, | Performed by: INTERNAL MEDICINE

## 2019-06-21 PROCEDURE — 99499 UNLISTED E&M SERVICE: CPT | Mod: HCNC,S$GLB,, | Performed by: INTERNAL MEDICINE

## 2019-06-21 NOTE — ASSESSMENT & PLAN NOTE
I reviewed patient's CT scan. CT scan from 6 months ago is unchanged with regards to the RUL nodule(GGO). Discussed with patient's daughter that given stability this is likely a benign process. Patient with significant debility from Parkinsonism.    At this time there is minimal concern for malignancy.   Recommend against further CT chest imaging.

## 2019-06-21 NOTE — PROGRESS NOTES
Subjective:       Patient ID: Gisselle Oseguera is a 88 y.o. female.    Chief Complaint: Lung Nodules    88 year old female with Parkinsonism and HTN who presents for evaluation of lung nodules. Patient has no respiratory complaints at this time.      Patient with CT chest in October 2018:   1.  Mixed density pulmonary nodule within the apical segment of the right upper lobe measuring 0.7 x 0.5 cm, which may correspond with a soft tissue nodule seen on chest radiograph 09/24/2018.  Additional opacity abutting the right minor fissure measuring 0.9 cm in long axis.  For a part solid nodule 6 mm or greater, Fleischner Society 2017 guidelines recommend follow up with non-contrast chest CT at 3-6 months to confirm persistence. If this nodule is unchanged and the solid component remains <6 mm, annual follow up CT should be performed for 5 years; however, persistence of a part-solid nodule with solid component ?6 mm should be considered highly suspicious and may warrant further workup with PET/CT, biopsy, or resection.     2.  Innumerable scattered pulmonary micronodules with the largest measuring 0.3 cm in the apicoposterior segment of the left upper lobe.  For multiple <6 mm sub solid nodules, Fleischner Society 2017 guidelines recommend short term follow up non-contrast chest CT in 3-6 months, as these are typically benign in nature (due to etiologies such as infection). If these findings persist at that time, additional follow up at 2 and 4 years after initial discovery should be considered in high risk patients to confirm absence of growth.     3.  Calcified granuloma within the lingula and multiple calcified mediastinal lymph nodes, compatible with old granulomatous disease.     CT in January with:      New right-sided sub solid opacities as well as stable sub solid and solid pulmonary opacities, as detailed above.  Etiology for the new opacities could be infectious or post inflammatory changes.  However, neoplastic  "process is not entirely excluded.  Follow-up with noncontrast enhanced CT chest in 3-6 months (May 2019-August 2019).     Scattered pulmonary micronodules, unchanged. Please review the report of the study from 10/02/2018 for further details and recommendations.        Patient has a remote smoking history. < 20 pack/year.    Interval hx: Patient returns for follow up appointment for lung nodules. Patient reports no aspiration or cough with eating.   She denies any respiratory symptoms.       Review of Systems   Respiratory: Negative for sputum production, shortness of breath and dyspnea on extertion.        Objective:       Vitals:    06/21/19 0927   BP: (!) 140/76   Pulse: 101   SpO2: (!) 93%   Weight: 51 kg (112 lb 7 oz)   Height: 5' 3" (1.6 m)       Physical Exam   Constitutional: She is oriented to person, place, and time. She appears well-developed and well-nourished. No distress.   HENT:   Head: Normocephalic.   Nose: Nose normal.   Neck: Normal range of motion. Neck supple.   Abdominal: Soft.   Musculoskeletal: She exhibits no edema or tenderness.   Neurological: She is alert and oriented to person, place, and time.   Skin: Skin is warm and dry. She is not diaphoretic.   Psychiatric: She has a normal mood and affect.   Nursing note and vitals reviewed.    Personal Diagnostic Review  CT of chest:   No flowsheet data found.      Assessment:       No diagnosis found.    Outpatient Encounter Medications as of 6/21/2019   Medication Sig Dispense Refill    ascorbic acid, vitamin C, (VITAMIN C) 500 MG tablet Take 500 mg by mouth once daily.      aspirin (ECOTRIN) 81 MG EC tablet Take 81 mg by mouth once daily.      calcium-vitamin D3 500 mg(1,250mg) -200 unit per tablet Take 1 tablet by mouth 2 (two) times daily with meals.      carbidopa-levodopa  mg (SINEMET)  mg per tablet Half tablet every hour 1260 tablet 3    citalopram (CELEXA) 10 MG tablet TAKE 1 TABLET ONE TIME DAILY 90 tablet 3    DOCUSATE " CALCIUM (STOOL SOFTENER ORAL)       donepezil (ARICEPT) 10 MG tablet Take 1 tablet (10 mg total) by mouth every evening. (Patient taking differently: Take 5 mg by mouth every evening. ) 30 tablet 11    influenza (FLUZONE HIGH-DOSE) 180 mcg/0.5 mL vaccine Inject into the muscle. 0.5 mL 0    multivitamin with minerals tablet       pravastatin (PRAVACHOL) 40 MG tablet Take 1 tablet (40 mg total) by mouth once daily. 90 tablet 4    verapamil (CALAN-SR) 180 MG CR tablet TAKE 1 TABLET ONE TIME DAILY 90 tablet 3    vitamin A palmitate-vitamin D2 10,000-400 unit Tab        No facility-administered encounter medications on file as of 6/21/2019.      No orders of the defined types were placed in this encounter.      Plan:       Lung nodules  I reviewed patient's CT scan. CT scan from 6 months ago is unchanged with regards to the RUL nodule(GGO). Discussed with patient's daughter that given stability this is likely a benign process. Patient with significant debility from Parkinsonism.    At this time there is minimal concern for malignancy.   Recommend against further CT chest imaging.     Follow up PRN.     Kassy Adrian MD

## 2019-07-08 ENCOUNTER — PATIENT MESSAGE (OUTPATIENT)
Dept: NEUROLOGY | Facility: CLINIC | Age: 84
End: 2019-07-08

## 2019-07-08 RX ORDER — DONEPEZIL HYDROCHLORIDE 10 MG/1
10 TABLET, FILM COATED ORAL NIGHTLY
Qty: 90 TABLET | Refills: 3 | Status: SHIPPED | OUTPATIENT
Start: 2019-07-08 | End: 2019-07-18 | Stop reason: SDUPTHER

## 2019-07-14 NOTE — TELEPHONE ENCOUNTER
----- Message from Roseline Abad sent at 4/11/2017  9:52 AM CDT -----  Contact: [facility]    [phone]  Pt is not able to take her prescription for nuplasid 75 mg.  pls call.    none

## 2019-07-18 NOTE — TELEPHONE ENCOUNTER
----- Message from Paddy Peck sent at 7/18/2019  9:28 AM CDT -----  Needs Advice    Reason for call: Kaley states donepezil (ARICEPT) 10 MG tablet needs to go to pharmacy below, not local [facility]        Communication Preference: Kaley (daughter) @ [phone]    Additional Information:    [facility] [address]  [address] [facility] [address]  Phone: [phone] Fax: [phone]

## 2019-07-19 ENCOUNTER — PATIENT MESSAGE (OUTPATIENT)
Dept: NEUROLOGY | Facility: CLINIC | Age: 84
End: 2019-07-19

## 2019-07-19 RX ORDER — DONEPEZIL HYDROCHLORIDE 10 MG/1
10 TABLET, FILM COATED ORAL NIGHTLY
Qty: 90 TABLET | Refills: 3 | Status: SHIPPED | OUTPATIENT
Start: 2019-07-19 | End: 2020-07-18

## 2019-07-19 NOTE — TELEPHONE ENCOUNTER
Spoke with Dr. Quinones and confirmed OK to restart the donepezil 10mg tablet as patient was previously taking (1 tablet per day). Kaley will  current script of [facility]il [facility] and await shipment from [facility] as well.

## 2019-08-08 ENCOUNTER — PATIENT MESSAGE (OUTPATIENT)
Dept: NEUROLOGY | Facility: CLINIC | Age: 84
End: 2019-08-08

## 2019-08-09 RX ORDER — CITALOPRAM 10 MG/1
TABLET ORAL
Qty: 90 TABLET | Refills: 3 | Status: SHIPPED | OUTPATIENT
Start: 2019-08-09 | End: 2019-09-27

## 2019-08-09 NOTE — TELEPHONE ENCOUNTER
----- Message from Panda Munoz RN sent at 8/9/2019  4:21 PM CDT -----  Contact: Son (Liu)  Patient's son requesting refill of citalopram 10mg tablet. Please refill/restart if appropriate. Thank you.

## 2019-08-13 ENCOUNTER — PATIENT MESSAGE (OUTPATIENT)
Dept: NEUROLOGY | Facility: CLINIC | Age: 84
End: 2019-08-13

## 2019-08-19 ENCOUNTER — PATIENT MESSAGE (OUTPATIENT)
Dept: INTERNAL MEDICINE | Facility: CLINIC | Age: 84
End: 2019-08-19

## 2019-08-19 DIAGNOSIS — G20.A1 IDIOPATHIC PARKINSON'S DISEASE: Primary | ICD-10-CM

## 2019-08-19 DIAGNOSIS — R41.3 MEMORY LOSS: ICD-10-CM

## 2019-08-19 DIAGNOSIS — I10 ESSENTIAL HYPERTENSION: ICD-10-CM

## 2019-08-19 DIAGNOSIS — R27.8 DECREASED COORDINATION: ICD-10-CM

## 2019-08-21 ENCOUNTER — PATIENT MESSAGE (OUTPATIENT)
Dept: INTERNAL MEDICINE | Facility: CLINIC | Age: 84
End: 2019-08-21

## 2019-08-23 ENCOUNTER — PATIENT MESSAGE (OUTPATIENT)
Dept: INTERNAL MEDICINE | Facility: CLINIC | Age: 84
End: 2019-08-23

## 2019-09-04 ENCOUNTER — OFFICE VISIT (OUTPATIENT)
Dept: NEUROLOGY | Facility: CLINIC | Age: 84
End: 2019-09-04
Payer: MEDICARE

## 2019-09-04 VITALS
HEART RATE: 67 BPM | WEIGHT: 115.06 LBS | DIASTOLIC BLOOD PRESSURE: 63 MMHG | HEIGHT: 63 IN | BODY MASS INDEX: 20.39 KG/M2 | SYSTOLIC BLOOD PRESSURE: 106 MMHG

## 2019-09-04 DIAGNOSIS — G20.A1 PD (PARKINSON'S DISEASE): Primary | ICD-10-CM

## 2019-09-04 PROCEDURE — 1101F PT FALLS ASSESS-DOCD LE1/YR: CPT | Mod: HCNC,CPTII,S$GLB, | Performed by: PSYCHIATRY & NEUROLOGY

## 2019-09-04 PROCEDURE — 99214 OFFICE O/P EST MOD 30 MIN: CPT | Mod: HCNC,S$GLB,, | Performed by: PSYCHIATRY & NEUROLOGY

## 2019-09-04 PROCEDURE — 99499 UNLISTED E&M SERVICE: CPT | Mod: HCNC,S$GLB,, | Performed by: PSYCHIATRY & NEUROLOGY

## 2019-09-04 PROCEDURE — 99214 PR OFFICE/OUTPT VISIT, EST, LEVL IV, 30-39 MIN: ICD-10-PCS | Mod: HCNC,S$GLB,, | Performed by: PSYCHIATRY & NEUROLOGY

## 2019-09-04 PROCEDURE — 99999 PR PBB SHADOW E&M-EST. PATIENT-LVL III: CPT | Mod: PBBFAC,HCNC,, | Performed by: PSYCHIATRY & NEUROLOGY

## 2019-09-04 PROCEDURE — 99999 PR PBB SHADOW E&M-EST. PATIENT-LVL III: ICD-10-PCS | Mod: PBBFAC,HCNC,, | Performed by: PSYCHIATRY & NEUROLOGY

## 2019-09-04 PROCEDURE — 99499 RISK ADDL DX/OHS AUDIT: ICD-10-PCS | Mod: HCNC,S$GLB,, | Performed by: PSYCHIATRY & NEUROLOGY

## 2019-09-04 PROCEDURE — 1101F PR PT FALLS ASSESS DOC 0-1 FALLS W/OUT INJ PAST YR: ICD-10-PCS | Mod: HCNC,CPTII,S$GLB, | Performed by: PSYCHIATRY & NEUROLOGY

## 2019-09-04 RX ORDER — CARBIDOPA AND LEVODOPA 25; 100 MG/1; MG/1
TABLET ORAL
Qty: 1260 TABLET | Refills: 3 | Status: SHIPPED | OUTPATIENT
Start: 2019-09-04

## 2019-09-04 RX ORDER — SERTRALINE HYDROCHLORIDE 25 MG/1
25 TABLET, FILM COATED ORAL DAILY
Qty: 90 TABLET | Refills: 3 | Status: ON HOLD | OUTPATIENT
Start: 2019-09-04 | End: 2019-09-28 | Stop reason: HOSPADM

## 2019-09-04 NOTE — PROGRESS NOTES
Name: Gisselle Oseguera  MRN: [id number]   CSN: [id number]      Date: 09/04/2019    Chief Complaint:   - more issues with GI again, discomfort after eating  - comes and goes   - having no hallucinations now - good change  - mobility has declined  - takes about 1/2 tab every hour from 8 am until 8 pm  -   -    From May 2019:  - still a few halluciantions  - no new falls  - help from family  - GI is ok  - appettie has recovered some  - might be near pasing out when stands up  - mood is OK, but unclear if citalopram has done anything previiusly    From Oct 2018:  - hallucinations are still there at night, some stacking  - taking her meds every hour on the 1/2 hour  - says her routine is about the same  - had some spell of low appetite, lost 10-15 lbs over three months  - had some GI upset  -  -    From July 2018:  - tremor is a little worse overall  - chronic LBP now, worse in the afternoon - hard to describe, but severe, responds to CD/LD.  - now taking 1/2 tab every hour  - naps help in the afternoon  - some walking for 1/2 mile over 30 minutes or so  - interested in PT    From April 2018:  - still hallucinations but a bit less  - more trouble tolreating donepezil at night  - willing to take in am  - poor sleep    From 1/2018  - still some hallucinations and no benefit of nuplazid  - memory likely a bit worse  - gait unstable     From October 2017:  - worried about nuplazid  - some persistent hallucniations  - memory is holding up now  - no falls but risk  - bowels stable  - bp under control    From March 2017  1) Never tried the sleep proposal below (melatonin in PM and coffee in AM) - will do now  2) Gait is stable  3) Hallucinations and delusions still exist    History of Present Illness (HPI):  Tremor dominant PD x 10+ years, L>R, started on Requip.  Then added Sinemet.  Did well for a long time.  Now having more issues with tremor breaking through.  Amantadine caused confusion and dizzy/fainting.  Not sure about  "Axilect.    "Fights constipation", no hyposmia, sleeps well at night (rare dreams, no RBD), no depression or anxiety - but started Lexapro to "take the edge off".  Doesn't think the tremors got worse.    ROS:  Review of Systems   Constitutional: Negative for malaise/fatigue and weight loss.   HENT: Negative for hearing loss.    Eyes: Negative for blurred vision and double vision.   Respiratory: Negative for shortness of breath and stridor.    Cardiovascular: Negative for chest pain and palpitations.   Gastrointestinal: Positive for constipation. Negative for nausea and vomiting.   Genitourinary: Negative for frequency and urgency.   Musculoskeletal: Negative for falls and myalgias.   Skin: Negative for rash.   Neurological: Positive for tremors. Negative for focal weakness and seizures.   Endo/Heme/Allergies: Does not bruise/bleed easily.   Psychiatric/Behavioral: Negative for depression, hallucinations and memory loss. The patient is not nervous/anxious.      Past Medical History: The patient  has a past medical history of Arthritis, Basal cell carcinoma, Cataract, Depression, Fever blister, High cholesterol, HTN (hypertension) (6/27/2013), Hypertension, Memory loss, and Parkinsonism.    Social History: The patient  reports that she quit smoking about 51 years ago. She has never used smokeless tobacco. She reports that she drinks alcohol. She reports that she does not use drugs.    Family History: Their family history includes Cancer (age of onset: 80) in her mother; Macular degeneration in her paternal aunt; No Known Problems in her brother, father, maternal aunt, maternal grandfather, maternal grandmother, maternal uncle, paternal grandfather, paternal grandmother, paternal uncle, and sister.    Allergies: Patient has no known allergies.     Meds:   Current Outpatient Medications on File Prior to Visit   Medication Sig Dispense Refill    ascorbic acid, vitamin C, (VITAMIN C) 500 MG tablet Take 500 mg by mouth " "once daily.      aspirin (ECOTRIN) 81 MG EC tablet Take 81 mg by mouth once daily.      calcium-vitamin D3 500 mg(1,250mg) -200 unit per tablet Take 1 tablet by mouth 2 (two) times daily with meals.      carbidopa-levodopa  mg (SINEMET)  mg per tablet Half tablet every hour 1260 tablet 3    citalopram (CELEXA) 10 MG tablet TAKE 1 TABLET ONE TIME DAILY 90 tablet 3    DOCUSATE CALCIUM (STOOL SOFTENER ORAL)       donepezil (ARICEPT) 10 MG tablet Take 1 tablet (10 mg total) by mouth every evening. 90 tablet 3    influenza (FLUZONE HIGH-DOSE) 180 mcg/0.5 mL vaccine Inject into the muscle. 0.5 mL 0    multivitamin with minerals tablet       verapamil (CALAN-SR) 180 MG CR tablet TAKE 1 TABLET ONE TIME DAILY 90 tablet 3    vitamin A palmitate-vitamin D2 10,000-400 unit Tab       pravastatin (PRAVACHOL) 40 MG tablet Take 1 tablet (40 mg total) by mouth once daily. 90 tablet 4     No current facility-administered medications on file prior to visit.        Exam:  /63   Pulse 67   Ht 5' 3" (1.6 m)   Wt 52.2 kg (115 lb 1.3 oz)   BMI 20.39 kg/m²     * Specialized movement exam  Hypophonic speech.    Moderate facial masking.   L>R moderate CW and mild rigidity.   L>>R resting tremor, some re-emergence with posture, + chin tremor.   Mild choreic dyskinesia only, no dystonia.   Gait is stooped, limited arm swing, mild postural instability.      Laboratory/Radiological:  - Results: none to review    - Independent review of images: none to review    Diagnoses:          1) PD - tremor dominant.   2) Parkinson's induced psychosis.  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  Getting ot at home for wc luli.          Jayjay Quinones MD, MPH  Division of Movement and Memory Disorders  [facility]  [phone]        "

## 2019-09-12 ENCOUNTER — PES CALL (OUTPATIENT)
Dept: ADMINISTRATIVE | Facility: CLINIC | Age: 84
End: 2019-09-12

## 2019-09-16 ENCOUNTER — PATIENT MESSAGE (OUTPATIENT)
Dept: NEUROLOGY | Facility: CLINIC | Age: 84
End: 2019-09-16

## 2019-09-25 ENCOUNTER — PES CALL (OUTPATIENT)
Dept: ADMINISTRATIVE | Facility: CLINIC | Age: 84
End: 2019-09-25

## 2019-09-27 ENCOUNTER — HOSPITAL ENCOUNTER (OUTPATIENT)
Facility: HOSPITAL | Age: 84
Discharge: HOME | End: 2019-09-28
Attending: EMERGENCY MEDICINE | Admitting: INTERNAL MEDICINE
Payer: MEDICARE

## 2019-09-27 DIAGNOSIS — I48.92 ATRIAL FLUTTER, PAROXYSMAL: Primary | ICD-10-CM

## 2019-09-27 DIAGNOSIS — I48.91 NEW ONSET ATRIAL FIBRILLATION: ICD-10-CM

## 2019-09-27 DIAGNOSIS — R55 SYNCOPE, UNSPECIFIED SYNCOPE TYPE: ICD-10-CM

## 2019-09-27 DIAGNOSIS — G20.A1 IDIOPATHIC PARKINSON'S DISEASE: ICD-10-CM

## 2019-09-27 DIAGNOSIS — R41.82 ALTERED MENTAL STATUS: ICD-10-CM

## 2019-09-27 DIAGNOSIS — R55 SYNCOPE: ICD-10-CM

## 2019-09-27 DIAGNOSIS — I10 ESSENTIAL HYPERTENSION: ICD-10-CM

## 2019-09-27 LAB
ANION GAP SERPL CALC-SCNC: 10 MMOL/L (ref 8–16)
ASCENDING AORTA: 3.36 CM
AV INDEX (PROSTH): 0.92
AV MEAN GRADIENT: 7 MMHG
AV PEAK GRADIENT: 11 MMHG
AV VALVE AREA: 2.84 CM2
AV VELOCITY RATIO: 0.94
BASOPHILS # BLD AUTO: 0.04 K/UL (ref 0–0.2)
BASOPHILS NFR BLD: 0.5 % (ref 0–1.9)
BSA FOR ECHO PROCEDURE: 1.56 M2
BUN SERPL-MCNC: 15 MG/DL (ref 8–23)
CALCIUM SERPL-MCNC: 9.5 MG/DL (ref 8.7–10.5)
CHLORIDE SERPL-SCNC: 100 MMOL/L (ref 95–110)
CO2 SERPL-SCNC: 27 MMOL/L (ref 23–29)
CREAT SERPL-MCNC: 0.8 MG/DL (ref 0.5–1.4)
CV ECHO LV RWT: 0.48 CM
DIFFERENTIAL METHOD: ABNORMAL
DOP CALC AO PEAK VEL: 1.66 M/S
DOP CALC AO VTI: 34.41 CM
DOP CALC LVOT AREA: 3.1 CM2
DOP CALC LVOT DIAMETER: 1.99 CM
DOP CALC LVOT PEAK VEL: 1.56 M/S
DOP CALC LVOT STROKE VOLUME: 97.89 CM3
DOP CALCLVOT PEAK VEL VTI: 31.49 CM
ECHO LV POSTERIOR WALL: 0.94 CM (ref 0.6–1.1)
EOSINOPHIL # BLD AUTO: 0 K/UL (ref 0–0.5)
EOSINOPHIL NFR BLD: 0.5 % (ref 0–8)
ERYTHROCYTE [DISTWIDTH] IN BLOOD BY AUTOMATED COUNT: 13.2 % (ref 11.5–14.5)
EST. GFR  (AFRICAN AMERICAN): >60 ML/MIN/1.73 M^2
EST. GFR  (NON AFRICAN AMERICAN): >60 ML/MIN/1.73 M^2
FRACTIONAL SHORTENING: 29 % (ref 28–44)
GLUCOSE SERPL-MCNC: 110 MG/DL (ref 70–110)
HCT VFR BLD AUTO: 38.6 % (ref 37–48.5)
HGB BLD-MCNC: 12.4 G/DL (ref 12–16)
IMM GRANULOCYTES # BLD AUTO: 0.05 K/UL (ref 0–0.04)
IMM GRANULOCYTES NFR BLD AUTO: 0.7 % (ref 0–0.5)
INR PPP: 1 (ref 0.8–1.2)
INTERVENTRICULAR SEPTUM: 1.09 CM (ref 0.6–1.1)
LA MAJOR: 5.66 CM
LA MINOR: 6.01 CM
LA WIDTH: 3.54 CM
LEFT ATRIUM SIZE: 3.97 CM
LEFT ATRIUM VOLUME INDEX: 44 ML/M2
LEFT ATRIUM VOLUME: 69.64 CM3
LEFT INTERNAL DIMENSION IN SYSTOLE: 2.76 CM (ref 2.1–4)
LEFT VENTRICLE DIASTOLIC VOLUME INDEX: 41.37 ML/M2
LEFT VENTRICLE DIASTOLIC VOLUME: 65.4 ML
LEFT VENTRICLE MASS INDEX: 79 G/M2
LEFT VENTRICLE SYSTOLIC VOLUME INDEX: 18 ML/M2
LEFT VENTRICLE SYSTOLIC VOLUME: 28.42 ML
LEFT VENTRICULAR INTERNAL DIMENSION IN DIASTOLE: 3.89 CM (ref 3.5–6)
LEFT VENTRICULAR MASS: 124.24 G
LYMPHOCYTES # BLD AUTO: 1.5 K/UL (ref 1–4.8)
LYMPHOCYTES NFR BLD: 20 % (ref 18–48)
MAGNESIUM SERPL-MCNC: 1.9 MG/DL (ref 1.6–2.6)
MCH RBC QN AUTO: 30.6 PG (ref 27–31)
MCHC RBC AUTO-ENTMCNC: 32.1 G/DL (ref 32–36)
MCV RBC AUTO: 95 FL (ref 82–98)
MONOCYTES # BLD AUTO: 0.6 K/UL (ref 0.3–1)
MONOCYTES NFR BLD: 7.6 % (ref 4–15)
NEUTROPHILS # BLD AUTO: 5.3 K/UL (ref 1.8–7.7)
NEUTROPHILS NFR BLD: 70.7 % (ref 38–73)
NRBC BLD-RTO: 0 /100 WBC
PISA TR MAX VEL: 2.59 M/S
PLATELET # BLD AUTO: 288 K/UL (ref 150–350)
PMV BLD AUTO: 9.9 FL (ref 9.2–12.9)
POTASSIUM SERPL-SCNC: 3.2 MMOL/L (ref 3.5–5.1)
PROTHROMBIN TIME: 10.3 SEC (ref 9–12.5)
RA MAJOR: 5.08 CM
RA PRESSURE: 3 MMHG
RA WIDTH: 3.11 CM
RBC # BLD AUTO: 4.05 M/UL (ref 4–5.4)
RIGHT VENTRICULAR END-DIASTOLIC DIMENSION: 2.49 CM
RV TISSUE DOPPLER FREE WALL SYSTOLIC VELOCITY 1 (APICAL 4 CHAMBER VIEW): 24 CM/S
SINUS: 3.28 CM
SODIUM SERPL-SCNC: 137 MMOL/L (ref 136–145)
STJ: 3.14 CM
TDI LATERAL: 0.11 M/S
TDI SEPTAL: 0.08 M/S
TDI: 0.1 M/S
TR MAX PG: 27 MMHG
TRICUSPID ANNULAR PLANE SYSTOLIC EXCURSION: 2.11 CM
TROPONIN I SERPL DL<=0.01 NG/ML-MCNC: 0.01 NG/ML (ref 0–0.03)
TSH SERPL DL<=0.005 MIU/L-ACNC: 3.85 UIU/ML (ref 0.4–4)
TV REST PULMONARY ARTERY PRESSURE: 30 MMHG
WBC # BLD AUTO: 7.46 K/UL (ref 3.9–12.7)

## 2019-09-27 PROCEDURE — 99220 PR INITIAL OBSERVATION CARE,LEVL III: CPT | Mod: HCNC,,, | Performed by: NURSE PRACTITIONER

## 2019-09-27 PROCEDURE — 84443 ASSAY THYROID STIM HORMONE: CPT | Mod: HCNC

## 2019-09-27 PROCEDURE — 25000003 PHARM REV CODE 250: Mod: HCNC | Performed by: NURSE PRACTITIONER

## 2019-09-27 PROCEDURE — 99285 PR EMERGENCY DEPT VISIT,LEVEL V: ICD-10-PCS | Mod: HCNC,,, | Performed by: EMERGENCY MEDICINE

## 2019-09-27 PROCEDURE — G0378 HOSPITAL OBSERVATION PER HR: HCPCS | Mod: HCNC

## 2019-09-27 PROCEDURE — 85025 COMPLETE CBC W/AUTO DIFF WBC: CPT | Mod: HCNC

## 2019-09-27 PROCEDURE — 93010 ELECTROCARDIOGRAM REPORT: CPT | Mod: 76,HCNC,, | Performed by: INTERNAL MEDICINE

## 2019-09-27 PROCEDURE — 93005 ELECTROCARDIOGRAM TRACING: CPT | Mod: HCNC

## 2019-09-27 PROCEDURE — 84484 ASSAY OF TROPONIN QUANT: CPT | Mod: HCNC

## 2019-09-27 PROCEDURE — 93010 EKG 12-LEAD: ICD-10-PCS | Mod: HCNC,,, | Performed by: INTERNAL MEDICINE

## 2019-09-27 PROCEDURE — 85610 PROTHROMBIN TIME: CPT | Mod: HCNC

## 2019-09-27 PROCEDURE — 99285 EMERGENCY DEPT VISIT HI MDM: CPT | Mod: HCNC,,, | Performed by: EMERGENCY MEDICINE

## 2019-09-27 PROCEDURE — 83735 ASSAY OF MAGNESIUM: CPT | Mod: HCNC

## 2019-09-27 PROCEDURE — 80048 BASIC METABOLIC PNL TOTAL CA: CPT | Mod: HCNC

## 2019-09-27 PROCEDURE — 99285 EMERGENCY DEPT VISIT HI MDM: CPT | Mod: 25,HCNC

## 2019-09-27 PROCEDURE — 93010 ELECTROCARDIOGRAM REPORT: CPT | Mod: HCNC,,, | Performed by: INTERNAL MEDICINE

## 2019-09-27 PROCEDURE — 63600175 PHARM REV CODE 636 W HCPCS: Mod: HCNC | Performed by: PHYSICIAN ASSISTANT

## 2019-09-27 PROCEDURE — 99220 PR INITIAL OBSERVATION CARE,LEVL III: ICD-10-PCS | Mod: HCNC,,, | Performed by: NURSE PRACTITIONER

## 2019-09-27 RX ORDER — SODIUM CHLORIDE 0.9 % (FLUSH) 0.9 %
5 SYRINGE (ML) INJECTION
Status: DISCONTINUED | OUTPATIENT
Start: 2019-09-27 | End: 2019-09-28 | Stop reason: HOSPADM

## 2019-09-27 RX ORDER — RAMELTEON 8 MG/1
8 TABLET ORAL NIGHTLY PRN
Status: DISCONTINUED | OUTPATIENT
Start: 2019-09-27 | End: 2019-09-27

## 2019-09-27 RX ORDER — ENOXAPARIN SODIUM 100 MG/ML
40 INJECTION SUBCUTANEOUS EVERY 24 HOURS
Status: DISCONTINUED | OUTPATIENT
Start: 2019-09-27 | End: 2019-09-28

## 2019-09-27 RX ORDER — CARBIDOPA AND LEVODOPA 25; 100 MG/1; MG/1
1 TABLET ORAL EVERY 24 HOURS
Status: DISCONTINUED | OUTPATIENT
Start: 2019-09-28 | End: 2019-09-28 | Stop reason: HOSPADM

## 2019-09-27 RX ORDER — BISACODYL 10 MG
10 SUPPOSITORY, RECTAL RECTAL DAILY PRN
Status: DISCONTINUED | OUTPATIENT
Start: 2019-09-27 | End: 2019-09-27

## 2019-09-27 RX ORDER — ONDANSETRON 2 MG/ML
4 INJECTION INTRAMUSCULAR; INTRAVENOUS EVERY 8 HOURS PRN
Status: DISCONTINUED | OUTPATIENT
Start: 2019-09-27 | End: 2019-09-28 | Stop reason: HOSPADM

## 2019-09-27 RX ORDER — ACETAMINOPHEN 325 MG/1
650 TABLET ORAL EVERY 4 HOURS PRN
Status: DISCONTINUED | OUTPATIENT
Start: 2019-09-27 | End: 2019-09-28 | Stop reason: HOSPADM

## 2019-09-27 RX ORDER — ASCORBIC ACID 500 MG
500 TABLET ORAL DAILY
Status: DISCONTINUED | OUTPATIENT
Start: 2019-09-28 | End: 2019-09-28 | Stop reason: HOSPADM

## 2019-09-27 RX ORDER — FERROUS SULFATE, DRIED 160(50) MG
1 TABLET, EXTENDED RELEASE ORAL 2 TIMES DAILY WITH MEALS
Status: DISCONTINUED | OUTPATIENT
Start: 2019-09-27 | End: 2019-09-28 | Stop reason: HOSPADM

## 2019-09-27 RX ORDER — POLYETHYLENE GLYCOL 3350 17 G/17G
17 POWDER, FOR SOLUTION ORAL 2 TIMES DAILY PRN
Status: DISCONTINUED | OUTPATIENT
Start: 2019-09-27 | End: 2019-09-28 | Stop reason: HOSPADM

## 2019-09-27 RX ORDER — IBUPROFEN 200 MG
24 TABLET ORAL
Status: DISCONTINUED | OUTPATIENT
Start: 2019-09-27 | End: 2019-09-27

## 2019-09-27 RX ORDER — GLUCAGON 1 MG
1 KIT INJECTION
Status: DISCONTINUED | OUTPATIENT
Start: 2019-09-27 | End: 2019-09-27

## 2019-09-27 RX ORDER — ACETAMINOPHEN 325 MG/1
650 TABLET ORAL EVERY 4 HOURS PRN
Status: DISCONTINUED | OUTPATIENT
Start: 2019-09-27 | End: 2019-09-27

## 2019-09-27 RX ORDER — RAMELTEON 8 MG/1
8 TABLET ORAL NIGHTLY PRN
Status: DISCONTINUED | OUTPATIENT
Start: 2019-09-27 | End: 2019-09-28 | Stop reason: HOSPADM

## 2019-09-27 RX ORDER — BISACODYL 10 MG
10 SUPPOSITORY, RECTAL RECTAL DAILY PRN
Status: DISCONTINUED | OUTPATIENT
Start: 2019-09-27 | End: 2019-09-28 | Stop reason: HOSPADM

## 2019-09-27 RX ORDER — POTASSIUM CHLORIDE 750 MG/1
30 CAPSULE, EXTENDED RELEASE ORAL
Status: COMPLETED | OUTPATIENT
Start: 2019-09-27 | End: 2019-09-27

## 2019-09-27 RX ORDER — POLYETHYLENE GLYCOL 3350 17 G/17G
17 POWDER, FOR SOLUTION ORAL DAILY
Status: DISCONTINUED | OUTPATIENT
Start: 2019-09-27 | End: 2019-09-27

## 2019-09-27 RX ORDER — DONEPEZIL HYDROCHLORIDE 10 MG/1
10 TABLET, FILM COATED ORAL NIGHTLY
Status: DISCONTINUED | OUTPATIENT
Start: 2019-09-27 | End: 2019-09-28 | Stop reason: HOSPADM

## 2019-09-27 RX ORDER — SODIUM CHLORIDE 0.9 % (FLUSH) 0.9 %
10 SYRINGE (ML) INJECTION
Status: DISCONTINUED | OUTPATIENT
Start: 2019-09-27 | End: 2019-09-27

## 2019-09-27 RX ORDER — HEPARIN SODIUM 5000 [USP'U]/ML
5000 INJECTION, SOLUTION INTRAVENOUS; SUBCUTANEOUS EVERY 8 HOURS
Status: DISCONTINUED | OUTPATIENT
Start: 2019-09-27 | End: 2019-09-27

## 2019-09-27 RX ORDER — IPRATROPIUM BROMIDE AND ALBUTEROL SULFATE 2.5; .5 MG/3ML; MG/3ML
3 SOLUTION RESPIRATORY (INHALATION) EVERY 4 HOURS PRN
Status: DISCONTINUED | OUTPATIENT
Start: 2019-09-27 | End: 2019-09-27

## 2019-09-27 RX ORDER — ONDANSETRON 8 MG/1
8 TABLET, ORALLY DISINTEGRATING ORAL EVERY 8 HOURS PRN
Status: DISCONTINUED | OUTPATIENT
Start: 2019-09-27 | End: 2019-09-27

## 2019-09-27 RX ORDER — SERTRALINE HYDROCHLORIDE 25 MG/1
25 TABLET, FILM COATED ORAL DAILY
Status: DISCONTINUED | OUTPATIENT
Start: 2019-09-28 | End: 2019-09-27

## 2019-09-27 RX ORDER — VERAPAMIL HYDROCHLORIDE 180 MG/1
180 TABLET, FILM COATED, EXTENDED RELEASE ORAL DAILY
Status: DISCONTINUED | OUTPATIENT
Start: 2019-09-28 | End: 2019-09-28

## 2019-09-27 RX ORDER — ONDANSETRON 8 MG/1
8 TABLET, ORALLY DISINTEGRATING ORAL EVERY 8 HOURS PRN
Status: DISCONTINUED | OUTPATIENT
Start: 2019-09-27 | End: 2019-09-28 | Stop reason: HOSPADM

## 2019-09-27 RX ORDER — AMOXICILLIN 250 MG
1 CAPSULE ORAL 2 TIMES DAILY
Status: DISCONTINUED | OUTPATIENT
Start: 2019-09-27 | End: 2019-09-28 | Stop reason: HOSPADM

## 2019-09-27 RX ORDER — IBUPROFEN 200 MG
16 TABLET ORAL
Status: DISCONTINUED | OUTPATIENT
Start: 2019-09-27 | End: 2019-09-27

## 2019-09-27 RX ORDER — ASPIRIN 81 MG/1
81 TABLET ORAL DAILY
Status: DISCONTINUED | OUTPATIENT
Start: 2019-09-28 | End: 2019-09-28 | Stop reason: HOSPADM

## 2019-09-27 RX ORDER — TRAMADOL HYDROCHLORIDE 50 MG/1
50 TABLET ORAL EVERY 6 HOURS PRN
Status: DISCONTINUED | OUTPATIENT
Start: 2019-09-27 | End: 2019-09-28 | Stop reason: HOSPADM

## 2019-09-27 RX ADMIN — POTASSIUM CHLORIDE 30 MEQ: 750 CAPSULE, EXTENDED RELEASE ORAL at 08:09

## 2019-09-27 RX ADMIN — HEPARIN SODIUM 5000 UNITS: 5000 INJECTION, SOLUTION INTRAVENOUS; SUBCUTANEOUS at 03:09

## 2019-09-27 RX ADMIN — POTASSIUM CHLORIDE 30 MEQ: 750 CAPSULE, EXTENDED RELEASE ORAL at 04:09

## 2019-09-27 RX ADMIN — CARBIDOPA AND LEVODOPA 1 SPLIT TABLET: 25; 100 TABLET ORAL at 10:09

## 2019-09-27 RX ADMIN — CARBIDOPA AND LEVODOPA 1 SPLIT TABLET: 25; 100 TABLET ORAL at 08:09

## 2019-09-27 RX ADMIN — DONEPEZIL HYDROCHLORIDE 10 MG: 10 TABLET ORAL at 08:09

## 2019-09-27 RX ADMIN — POTASSIUM CHLORIDE 30 MEQ: 750 CAPSULE, EXTENDED RELEASE ORAL at 10:09

## 2019-09-27 NOTE — ED TRIAGE NOTES
Pt is a 88 year old female admitted for loss of consciousness at home while the caretaker was present.     LOC: The patient is awake, alert, and aware of environment. The patient is oriented x 3 and speaking appropriately.   APPEARANCE: No acute distress noted.   PSYCHOSOCIAL: Patient is calm and cooperative.   SKIN: The skin is warm, dry.   RESPIRATORY: Airway is open and patent. Bilateral chest rise and fall. Respirations are spontaneous, even and unlabored. Normal effort and rate noted. No accessory muscle use noted.   CARDIAC: Patient has a normal rate and rhythm. Denies chest pain or SOB.   ABDOMEN: Soft and non tender to palpation. No distention noted.   URINARY:  Voids independently.   EXTREMITIES: No swelling noted.   NEUROLOGIC: Eyes open spontaneously. Speech clear. Tolerating saliva secretions well. Able to follow commands, demonstrating ability to actively and appropriately communicate within context of current conversation. Symmetrical facial muscles. Moving all extremities well. Movement is purposeful.   MUSCULOSKELETAL: No obvious deformities noted.

## 2019-09-27 NOTE — H&P
"[facility] Hospital Medicine  History & Physical    Patient Name: Gisselle Oseguera  MRN: [id number]  Admission Date: 9/27/2019  Attending Physician: Jayjay Pearson MD   Primary Care Provider: Philip Karimi MD    [facility] Medicine Team: [facility] MED FIFI Dove NP     Patient information was obtained from patient, caregiver / friend, past medical records and ER records.     Subjective:     Principal Problem:Atrial flutter, paroxysmal    Chief Complaint:   Chief Complaint   Patient presents with    Loss of Consciousness     arrives per  EMS from home with care taker. Care taker reports almost passed out in bathroom, sat pt on toilet then went out and stopped breathing, caretaker did chest compressions for few seconds (not sure if she didnt have pulse, states "she wasn't breathing") and then pt came to. EMS reports near syncope with standing upon their arrival. pt current AAOx3. C/o nausea and abdominal pain.         HPI: Ms. Oseguera is an 88 year old female with past medical history of Parkinson's, HTN, HLD, OAB, and lung nodules (seen by pulm, low risk for malignancy), who presented to ER for syncope. Per Caregiver report the patient was assisted to the bathroom and while sitting on the toilet she had a syncopal episode versus seizure (did have shaking activity however does have parkinsons). Caregiver reported symptoms lasted about 3 minutes, after patient stopped shaking she did not feel the patient was breathing and did some chest compressions, she did not check for a pulse at that time. The patient came back to her baseline mentally within the next minute and has remained at baseline since. She had recent Neurology visit 09/04/2019 where carbidopa-levodopa regimen was altered with addition of sertraline and myrbetriq; per caregiver did not begin taking medications until 09/21. Per caregiver she denies recent decrease in PO intake, cold symptoms, concerns for UTI. She does endorse " "recent increase in hallucinations and " not quite herself" since beginning new medications last Saturday. She has 24/7 caregivers, uses rolling walker PRN, and has had near falls in the past. All past medical, social, and family history reviewed.     In ER: Patient noted to be in aflutter rate controlled on initial EKG (EKG not documented in Epic system). Spontaneously converted into NSR without intervention. Afebrile without leukocytosis. CTH unremarkable. Troponin WNL. CXR unremarkable. CBC unremarkable. BMP with potassium 3.2 otherwise unremarkable. TSH WNL. The patient was placed in observation to the Hospital Medicine Service for further evaluation and treatment.     Past Medical History:   Diagnosis Date    Arthritis     Basal cell carcinoma     Cataract     Depression     Fever blister     High cholesterol     HTN (hypertension) 6/27/2013    Hypertension     Memory loss     Parkinsonism        Past Surgical History:   Procedure Laterality Date    CATARACT EXTRACTION      HYSTERECTOMY      TONSILLECTOMY         Review of patient's allergies indicates:  No Known Allergies    No current facility-administered medications on file prior to encounter.      Current Outpatient Medications on File Prior to Encounter   Medication Sig    verapamil (CALAN-SR) 180 MG CR tablet TAKE 1 TABLET ONE TIME DAILY    ascorbic acid, vitamin C, (VITAMIN C) 500 MG tablet Take 500 mg by mouth once daily.    aspirin (ECOTRIN) 81 MG EC tablet Take 81 mg by mouth once daily.    calcium-vitamin D3 500 mg(1,250mg) -200 unit per tablet Take 1 tablet by mouth 2 (two) times daily with meals.    carbidopa-levodopa  mg (SINEMET)  mg per tablet Take up to 12 tablets divided as instructed    DOCUSATE CALCIUM (STOOL SOFTENER ORAL) Take 1 tablet by mouth daily as needed.     donepezil (ARICEPT) 10 MG tablet Take 1 tablet (10 mg total) by mouth every evening.    influenza (FLUZONE HIGH-DOSE) 180 mcg/0.5 mL vaccine " Inject into the muscle.    mirabegron (MYRBETRIQ) 25 mg Tb24 ER tablet Take 1 tablet (25 mg total) by mouth once daily.    multivitamin with minerals tablet     sertraline (ZOLOFT) 25 MG tablet Take 1 tablet (25 mg total) by mouth once daily.    [DISCONTINUED] citalopram (CELEXA) 10 MG tablet TAKE 1 TABLET ONE TIME DAILY    [DISCONTINUED] pravastatin (PRAVACHOL) 40 MG tablet Take 1 tablet (40 mg total) by mouth once daily.    [DISCONTINUED] vitamin A palmitate-vitamin D2 10,000-400 unit Tab      Family History     Problem Relation (Age of Onset)    Cancer Mother (80)    Macular degeneration Paternal Aunt    No Known Problems Father, Sister, Brother, Maternal Aunt, Maternal Uncle, Paternal Uncle, Maternal Grandmother, Maternal Grandfather, Paternal Grandmother, Paternal Grandfather        Tobacco Use    Smoking status: Former Smoker     Last attempt to quit: 8/3/1968     Years since quittin.1    Smokeless tobacco: Never Used   Substance and Sexual Activity    Alcohol use: Yes     Comment: social    Drug use: No    Sexual activity: Not on file     Review of Systems   Unable to perform ROS: Dementia     Objective:     Vital Signs (Most Recent):  Temp: 97.5 °F (36.4 °C) (19 0947)  Pulse: 72 (19 1512)  Resp: 17 (19 1512)  BP: 138/65 (19 1331)  SpO2: 95 % (19 1512) Vital Signs (24h Range):  Temp:  [97.5 °F (36.4 °C)] 97.5 °F (36.4 °C)  Pulse:  [] 72  Resp:  [12-40] 17  SpO2:  [94 %-97 %] 95 %  BP: (124-179)/(56-77) 138/65     Weight: 52.2 kg (115 lb)  Body mass index is 19.14 kg/m².    Physical Exam   Constitutional: She is oriented to person, place, and time. She appears well-developed and well-nourished. No distress.   HENT:   Head: Normocephalic and atraumatic.   Mouth/Throat: Mucous membranes are normal. Normal dentition. No oropharyngeal exudate.   Eyes: Conjunctivae, EOM and lids are normal.   Neck: Normal range of motion. Neck supple. No JVD present.    Cardiovascular: Regular rhythm, normal heart sounds and intact distal pulses. Bradycardia present.   No murmur heard.  Pulmonary/Chest: Effort normal and breath sounds normal. She exhibits no tenderness.   Abdominal: Soft. Bowel sounds are normal. She exhibits no distension. There is no tenderness.   Musculoskeletal: Normal range of motion. She exhibits no edema or deformity.   Neurological: She is alert and oriented to person, place, and time. No cranial nerve deficit.   Skin: Skin is warm and dry. No rash noted. She is not diaphoretic. No erythema.   Psychiatric: She has a normal mood and affect. Judgment and thought content normal.   Nursing note and vitals reviewed.        CRANIAL NERVES     CN III, IV, VI   Extraocular motions are normal.     Significant Labs:   CBC:   Recent Labs   Lab 09/27/19  1013   WBC 7.46   HGB 12.4   HCT 38.6        CMP:   Recent Labs   Lab 09/27/19  1013      K 3.2*      CO2 27      BUN 15   CREATININE 0.8   CALCIUM 9.5   ANIONGAP 10   EGFRNONAA >60.0     Troponin:   Recent Labs   Lab 09/27/19  1013   TROPONINI 0.009     TSH:   Recent Labs   Lab 09/27/19  1013   TSH 3.851     All pertinent labs within the past 24 hours have been reviewed.    Significant Imaging: I have reviewed all pertinent imaging results/findings within the past 24 hours.    Assessment/Plan:     * Atrial flutter, paroxysmal  -admitted 09/27 due to new onset atrial arrhythmia >>> spontaneous conversion to NSR  -in the ED initial EKG aflutter rate controlled (EKG not documented in Epic system) with spontaneously converted into NSR without intervention, afebrile without leukocytosis, CTH unremarkable, troponin WNL, CXR unremarkable, CBC unremarkable, BMP with potassium 3.2 otherwise unremarkable, and TSH WNL  -NSR EKG noted QTc 333  -etiology of arrhythmia unknown; no evidence of infection, thyroid dysfunction, or dehydration  -recent addition of myrbetriq and zoloft on 09/21 per caregiver,  "patient has since not "been herself and more hallucinations" >>> will hold both medications due to side effect concerns  -TTE pending  -hold BB due to bradycardia at rest, tele monitoring difficult to assess due to parkinson's tremors  -CHADSVASc 4, HAS-BLED 2  -discuss OAC with family and pharmD >> concern for high fall risk  -consider 30 day event monitor at DC for ongoing monitoring although date limited due to tremors  -continue tele monitoring  -EKG PRN arrhythmia     Syncope  -likely relate to new onset arrhythmia versus +/- vasovagal response in setting of recent medication changes noted above  -CTH negative, appears hydrated and compensated on exam, HDS  -orthostatic BP pending  -fall precautions  -monitor     Idiopathic Parkinson's disease  -chronic  -continue home carbidopa/levodopa >> recent dose change   -follows with Neurology outpt    HTN (hypertension)  -chronic, elevated on arrival >> monitor for improvement  -TTE pending for assessment   -continue home verapmil, hold parameters in place  -monitor     Aortic atherosclerosis  -chronic  -no home statin  -lipid panel in AM     Impaired functional mobility, balance, gait, and endurance  -fall precautions  -consult PT/OT if extended stay expected  -24/7 caregiver support, uses rolling walker PRN    VTE Risk Mitigation (From admission, onward)         Ordered     enoxaparin injection 40 mg  Daily      09/27/19 1547     IP VTE HIGH RISK PATIENT  Once      09/27/19 1547     Place sequential compression device  Until discontinued      09/27/19 1414                Zoe Dove, DNP, AG-ACNP, BC  Department of Hospital Medicine  [facility]  Pager [phone]  [facility] [id number]  "

## 2019-09-27 NOTE — ASSESSMENT & PLAN NOTE
"-admitted 09/27 due to new onset atrial arrhythmia >>> spontaneous conversion to NSR  -in the ED initial EKG aflutter rate controlled (EKG not documented in Epic system) with spontaneously converted into NSR without intervention, afebrile without leukocytosis, CTH unremarkable, troponin WNL, CXR unremarkable, CBC unremarkable, BMP with potassium 3.2 otherwise unremarkable, and TSH WNL  -NSR EKG noted QTc 333  -etiology of arrhythmia unknown; no evidence of infection, thyroid dysfunction, or dehydration  -recent addition of myrbetriq and zoloft on 09/21 per caregiver, patient has since not "been herself and more hallucinations" >>> will hold both medications due to side effect concerns  -TTE pending  -hold BB due to bradycardia at rest, tele monitoring difficult to assess due to parkinson's tremors  -CHADSVASc 4, HAS-BLED 2  -discuss OAC with family and pharmD >> concern for high fall risk  -consider 30 day event monitor at DC for ongoing monitoring although date limited due to tremors  -continue tele monitoring  -EKG PRN arrhythmia   "

## 2019-09-27 NOTE — ED PROVIDER NOTES
"Encounter Date: 9/27/2019       History     Chief Complaint   Patient presents with    Loss of Consciousness     arrives per  EMS from home with care taker. Care taker reports almost passed out in bathroom, sat pt on toilet then went out and stopped breathing, caretaker did chest compressions for few seconds (not sure if she didnt have pulse, states "she wasn't breathing") and then pt came to. EMS reports near syncope with standing upon their arrival. pt current AAOx3. C/o nausea and abdominal pain.      88-year-old female presents with a sudden onset of loss of conscious that occurred just prior to arrival.  The patient has a history of Parkinson's disease.  Her caretaker was with her which the patient was assisted to the bathroom.  While she was sitting on the toilet she had a syncopal versus seizure episode.  The a caretaker states she did have shaking like activity, however she does have Parkinson's.  She stated the symptoms subjectively lasted about 3 min.  She stated after she stop shaking she did not feel the patient was breathing and did some chest compressions.  She never checked to see if there was a pulse are not.  The patient came back to her baseline mentally within the next 1 min and is at her normal baseline.    The patient it denies any pain. She states she is little nauseated but has no other complaints.        Review of patient's allergies indicates:  No Known Allergies  Past Medical History:   Diagnosis Date    Arthritis     Basal cell carcinoma     Cataract     Depression     Fever blister     High cholesterol     HTN (hypertension) 6/27/2013    Hypertension     Memory loss     Parkinsonism      Past Surgical History:   Procedure Laterality Date    CATARACT EXTRACTION      HYSTERECTOMY      TONSILLECTOMY       Family History   Problem Relation Age of Onset    Cancer Mother 80        colon    Macular degeneration Paternal Aunt     No Known Problems Father     No Known Problems " Sister     No Known Problems Brother     No Known Problems Maternal Aunt     No Known Problems Maternal Uncle     No Known Problems Paternal Uncle     No Known Problems Maternal Grandmother     No Known Problems Maternal Grandfather     No Known Problems Paternal Grandmother     No Known Problems Paternal Grandfather     Melanoma Neg Hx     Blindness Neg Hx     Glaucoma Neg Hx     Retinal detachment Neg Hx     Amblyopia Neg Hx     Cataracts Neg Hx     Diabetes Neg Hx     Hypertension Neg Hx     Strabismus Neg Hx     Stroke Neg Hx     Thyroid disease Neg Hx      Social History     Tobacco Use    Smoking status: Former Smoker     Last attempt to quit: 8/3/1968     Years since quittin.1    Smokeless tobacco: Never Used   Substance Use Topics    Alcohol use: Yes     Comment: social    Drug use: No     Review of Systems   Constitutional: Negative for chills and diaphoresis.   HENT: Negative for congestion.    Eyes: Negative for discharge.   Respiratory: Negative for shortness of breath.    Cardiovascular: Negative for chest pain.   Gastrointestinal: Positive for nausea.   Genitourinary: Negative for dysuria.   Musculoskeletal: Negative for back pain.   Neurological: Positive for seizures and syncope. Negative for dizziness.        Positive for questionable syncope versus seizure like activity   Hematological: Does not bruise/bleed easily.   All other systems reviewed and are negative.      Physical Exam     Initial Vitals [19 0947]   BP Pulse Resp Temp SpO2   (!) 145/65 (!) 130 20 97.5 °F (36.4 °C) 95 %      MAP       --         Physical Exam    Nursing note and vitals reviewed.  Constitutional: She appears well-developed and well-nourished.  Non-toxic appearance. She does not have a sickly appearance. No distress.   HENT:   Head: Normocephalic and atraumatic.   Nose: Nose normal.   Mouth/Throat: Oropharynx is clear and moist.   Eyes: Conjunctivae, EOM and lids are normal. Pupils are  equal, round, and reactive to light. Right eye exhibits no nystagmus. Left eye exhibits no nystagmus.   Neck: Trachea normal, normal range of motion and phonation normal. Neck supple. No stridor present.   Cardiovascular: Normal rate, regular rhythm, normal heart sounds and normal pulses. Exam reveals no gallop and no friction rub.    No murmur heard.  Pulmonary/Chest: Breath sounds normal. No respiratory distress. She has no wheezes. She has no rhonchi. She has no rales.   Abdominal: Soft. Normal appearance and bowel sounds are normal. She exhibits no distension. There is no tenderness. There is no rigidity, no rebound, no guarding, no tenderness at McBurney's point and negative Betancourt's sign.   Musculoskeletal: She exhibits no edema or tenderness.   Neurological: She is alert and oriented to person, place, and time. She has normal strength and normal reflexes. She displays normal reflexes. No cranial nerve deficit or sensory deficit. GCS eye subscore is 4. GCS verbal subscore is 5. GCS motor subscore is 6.   Patient has an essential tremor which is worse her upper extremities compared to her lower extremities.   Skin: Skin is warm, dry and intact. Capillary refill takes less than 2 seconds. No rash noted. No pallor.   Psychiatric: Her speech is normal and behavior is normal.         ED Course   Procedures  Labs Reviewed   CBC W/ AUTO DIFFERENTIAL - Abnormal; Notable for the following components:       Result Value    Immature Granulocytes 0.7 (*)     Immature Grans (Abs) 0.05 (*)     All other components within normal limits   BASIC METABOLIC PANEL - Abnormal; Notable for the following components:    Potassium 3.2 (*)     All other components within normal limits   TROPONIN I    Narrative:     add on [id number] per Dr. Coto  09/27/2019  11:21 [place]   PROTIME-INR   TSH   TSH    Narrative:     add on [id number] per Dr. Coto  09/27/2019  11:21 [place]   URINALYSIS, REFLEX TO URINE CULTURE      EKG Readings: (Independently Interpreted)   EKG 1.  At 9:58 a.m. shows regular narrow QRS rate of 67.  Appears to be in atrial flutter with a 4-1 av block.  No acute ST T wave abnormalities.  Overall impression atrial flutter rate controlled on a 4-1 av block    EKG 2.  Is at 10:13 a.m. showing normal sinus rhythm at a rate of 68, normal intervals, narrow QRS, no acute ST T wave abnormalities.  Overall impression normal EKG.     ECG Results          EKG 12-lead (In process)  Result time 09/27/19 10:53:53    In process by Interface, Lab In [facility] (09/27/19 10:53:53)                 Narrative:    Test Reason : R55,    Vent. Rate : 068 BPM     Atrial Rate : 068 BPM     P-R Int : 166 ms          QRS Dur : 082 ms      QT Int : 314 ms       P-R-T Axes : 081 060 058 degrees     QTc Int : 333 ms    Sinus rhythm with Premature atrial complexes  Nonspecific T wave abnormality  Abnormal ECG  When compared with ECG of 04-FEB-2015 13:19,  Previous ECG has undetermined rhythm, needs review  Nonspecific T wave abnormality now evident in Inferior leads  Nonspecific T wave abnormality now evident in Anterior-lateral leads  QT has shortened    Referred By: [email or web address]   SELF           Confirmed By:                   In process by Interface, Lab In [facility] (09/27/19 10:53:45)                 Narrative:    Test Reason : R55,    Vent. Rate : 068 BPM     Atrial Rate : 068 BPM     P-R Int : 166 ms          QRS Dur : 082 ms      QT Int : 314 ms       P-R-T Axes : 081 060 058 degrees     QTc Int : 333 ms    Sinus rhythm with Premature atrial complexes  Nonspecific T wave abnormality  Abnormal ECG  When compared with ECG of 27-SEP-2019 10:01,  Nonspecific T wave abnormality now evident in Anterior-lateral leads  QT has shortened    Referred By: AAAREFERR   SELF           Confirmed By:                             Imaging Results          CT Head Without Contrast (Final result)  Result time 09/27/19 12:05:06    Final result by Black TIPTON  MD Philip (09/27/19 12:05:06)                 Impression:      Stable exam as above.  No evidence of acute hemorrhage or major vascular distribution infarct.      Electronically signed by: Black Palacios MD  Date:    09/27/2019  Time:    12:05             Narrative:    EXAMINATION:  CT HEAD WITHOUT CONTRAST    CLINICAL HISTORY:  Confusion/delirium, altered LOC, unexplained; Altered mental status, unspecified    TECHNIQUE:  Low dose axial CT images obtained throughout the head without the use of intravenous contrast.  Axial, sagittal and coronal reconstructions were performed.    COMPARISON:  04/04/2019    FINDINGS:  Intracranial compartment:    Ventricles are stable in size.  Cavum septum pellucidum.  No evidence of hydrocephalus.    Mild chronic microvascular ischemic disease, similar to prior.  No new parenchymal mass, hemorrhage, edema or major vascular distribution infarct.    No extra-axial blood or fluid collections.    Skull/extracranial contents (limited evaluation):    No fracture. Mastoid air cells and paranasal sinuses are essentially clear.                               X-Ray Chest AP Portable (Final result)  Result time 09/27/19 10:34:20    Final result by Oliver Rodriguez MD (09/27/19 10:34:20)                 Impression:      Single view of the chest demonstrated no evidence of acute cardiac or pulmonary process.      Electronically signed by: Oliver Rodriguez MD  Date:    09/27/2019  Time:    10:34             Narrative:    EXAMINATION:  XR CHEST AP PORTABLE    CLINICAL HISTORY:  Syncope and collapse    TECHNIQUE:  Single frontal view of the chest was performed.    COMPARISON:  Prior study dated 24 September 2018    FINDINGS:  Monitor leads and wires are in place.  There is stable radiographic appearance of the cardiomediastinal shadow, both hilar regions and the lungs.  Small granulomatous focus is again projected within the left CP angle.  There remains no evidence of effusion.  There is stable  appearance of the included osseous structures on this single view.                                 Medical Decision Making:   History:   Old Medical Records: I decided to obtain old medical records.  Old Records Summarized: records from clinic visits.       <> Summary of Records: The patient had a clinic visit with Neurology on 09/10/2019.  Zoloft was added to her medication regimen as well as myrbetriq.  Also change the regimen of carbidopa levodopa.  Initial Assessment:   88-year-old female loss conscious.  Differential could include syncope, arrhythmia, seizure, electrolyte abnormalities, vasovagal.  Will get labs, EKG CT of the brain and observed.                   ED Course as of Sep 27 1305   Fri Sep 27, 2019   1026 EKG shows atrial flutter with a 4-1 av block.  It is rate controlled.  She has no history of arrhythmia.  Her 2nd EKG is in sinus rhythm without any medication.    []   1152 Troponins negative.  INR is normal. BMP and CBC are normal.    []   1300 Will consultHospital Medicine for observation.    []   1304 To admit to Dr Barnard. Updated patient, son and care taker.    []      ED Course User Index  [SM] Casimiro Coto DO     Clinical Impression:     1. Atrial flutter, paroxysmal    2. Syncope    3. Altered mental status    4. Syncope, unspecified syncope type                                  Casimiro Coto,   09/27/19 1305

## 2019-09-27 NOTE — ASSESSMENT & PLAN NOTE
-fall precautions  -consult PT/OT if extended stay expected  -24/7 caregiver support, uses rolling walker PRN

## 2019-09-27 NOTE — PHARMACY MED REC
"Admission Medication Reconciliation - Pharmacy Consult Note    The home medication history was taken by Chidi Perez, [facility] pharmacy intern.  Based on information gathered and subsequent review by the clinical pharmacist, the items below may need attention.    You may go to "Admission" then "Reconcile Home Medications" tabs to review and/or act upon these items.    No issues noted with the medication reconciliation.    Please address this information as you see fit.  Feel free to contact us if you have any questions or require assistance.    Chidi Perez  EXT [phone]    "

## 2019-09-27 NOTE — HPI
"Ms. Oseguera is an 88 year old female with past medical history of Parkinson's, HTN, HLD, OAB, and lung nodules (seen by pulm, low risk for malignancy), who presented to ER for syncope. Per Caregiver report the patient was assisted to the bathroom and while sitting on the toilet she had a syncopal episode versus seizure (did have shaking activity however does have parkinsons). Caregiver reported symptoms lasted about 3 minutes, after patient stopped shaking she did not feel the patient was breathing and did some chest compressions, she did not check for a pulse at that time. The patient came back to her baseline mentally within the next minute and has remained at baseline since. She had recent Neurology visit 09/04/2019 where carbidopa-levodopa regimen was altered with addition of sertraline and myrbetriq; per caregiver did not begin taking medications until 09/21. Per caregiver she denies recent decrease in PO intake, cold symptoms, concerns for UTI. She does endorse recent increase in hallucinations and " not quite herself" since beginning new medications last Saturday. She has 24/7 caregivers, uses rolling walker PRN, and has had near falls in the past. All past medical, social, and family history reviewed.     In ER: Patient noted to be in aflutter rate controlled on initial EKG (EKG not documented in Epic system). Spontaneously converted into NSR without intervention. Afebrile without leukocytosis. CTH unremarkable. Troponin WNL. CXR unremarkable. CBC unremarkable. BMP with potassium 3.2 otherwise unremarkable. TSH WNL. The patient was placed in observation to the Hospital Medicine Service for further evaluation and treatment.   "

## 2019-09-27 NOTE — ASSESSMENT & PLAN NOTE
-likely relate to new onset arrhythmia versus +/- vasovagal response in setting of recent medication changes noted above  -CTH negative, appears hydrated and compensated on exam, HDS  -orthostatic BP pending  -fall precautions  -monitor

## 2019-09-27 NOTE — ASSESSMENT & PLAN NOTE
-chronic, elevated on arrival >> monitor for improvement  -TTE pending for assessment   -continue home verapmil, hold parameters in place  -monitor

## 2019-09-28 VITALS
BODY MASS INDEX: 18.48 KG/M2 | OXYGEN SATURATION: 96 % | DIASTOLIC BLOOD PRESSURE: 58 MMHG | HEART RATE: 71 BPM | TEMPERATURE: 98 F | SYSTOLIC BLOOD PRESSURE: 101 MMHG | WEIGHT: 115 LBS | HEIGHT: 66 IN | RESPIRATION RATE: 17 BRPM

## 2019-09-28 PROBLEM — R33.9 URINARY RETENTION WITH INCOMPLETE BLADDER EMPTYING: Status: ACTIVE | Noted: 2019-09-28

## 2019-09-28 LAB
ANION GAP SERPL CALC-SCNC: 7 MMOL/L (ref 8–16)
BILIRUB UR QL STRIP: NEGATIVE
BUN SERPL-MCNC: 16 MG/DL (ref 8–23)
CALCIUM SERPL-MCNC: 9.5 MG/DL (ref 8.7–10.5)
CHLORIDE SERPL-SCNC: 106 MMOL/L (ref 95–110)
CHOLEST SERPL-MCNC: 162 MG/DL (ref 120–199)
CHOLEST/HDLC SERPL: 3 {RATIO} (ref 2–5)
CLARITY UR REFRACT.AUTO: ABNORMAL
CO2 SERPL-SCNC: 24 MMOL/L (ref 23–29)
COLOR UR AUTO: YELLOW
CREAT SERPL-MCNC: 0.7 MG/DL (ref 0.5–1.4)
EST. GFR  (AFRICAN AMERICAN): >60 ML/MIN/1.73 M^2
EST. GFR  (NON AFRICAN AMERICAN): >60 ML/MIN/1.73 M^2
GLUCOSE SERPL-MCNC: 87 MG/DL (ref 70–110)
GLUCOSE UR QL STRIP: NEGATIVE
HDLC SERPL-MCNC: 54 MG/DL (ref 40–75)
HDLC SERPL: 33.3 % (ref 20–50)
HGB UR QL STRIP: NEGATIVE
KETONES UR QL STRIP: ABNORMAL
LDLC SERPL CALC-MCNC: 90.6 MG/DL (ref 63–159)
LEUKOCYTE ESTERASE UR QL STRIP: NEGATIVE
MAGNESIUM SERPL-MCNC: 2 MG/DL (ref 1.6–2.6)
MICROSCOPIC COMMENT: NORMAL
NITRITE UR QL STRIP: NEGATIVE
NONHDLC SERPL-MCNC: 108 MG/DL
PH UR STRIP: 5 [PH] (ref 5–8)
POTASSIUM SERPL-SCNC: 5.4 MMOL/L (ref 3.5–5.1)
PROT UR QL STRIP: NEGATIVE
RBC #/AREA URNS AUTO: 3 /HPF (ref 0–4)
SODIUM SERPL-SCNC: 137 MMOL/L (ref 136–145)
SP GR UR STRIP: 1.02 (ref 1–1.03)
SQUAMOUS #/AREA URNS AUTO: 1 /HPF
TRIGL SERPL-MCNC: 87 MG/DL (ref 30–150)
URN SPEC COLLECT METH UR: ABNORMAL

## 2019-09-28 PROCEDURE — G0378 HOSPITAL OBSERVATION PER HR: HCPCS | Mod: HCNC

## 2019-09-28 PROCEDURE — 99226 PR SUBSEQUENT OBSERVATION CARE,LEVEL III: CPT | Mod: HCNC,,, | Performed by: NURSE PRACTITIONER

## 2019-09-28 PROCEDURE — 93005 ELECTROCARDIOGRAM TRACING: CPT | Mod: HCNC

## 2019-09-28 PROCEDURE — 25000003 PHARM REV CODE 250: Mod: HCNC | Performed by: NURSE PRACTITIONER

## 2019-09-28 PROCEDURE — 36415 COLL VENOUS BLD VENIPUNCTURE: CPT | Mod: HCNC

## 2019-09-28 PROCEDURE — 99226 PR SUBSEQUENT OBSERVATION CARE,LEVEL III: ICD-10-PCS | Mod: HCNC,,, | Performed by: NURSE PRACTITIONER

## 2019-09-28 PROCEDURE — 81001 URINALYSIS AUTO W/SCOPE: CPT | Mod: HCNC

## 2019-09-28 PROCEDURE — 93010 ELECTROCARDIOGRAM REPORT: CPT | Mod: HCNC,,, | Performed by: INTERNAL MEDICINE

## 2019-09-28 PROCEDURE — 80048 BASIC METABOLIC PNL TOTAL CA: CPT | Mod: HCNC

## 2019-09-28 PROCEDURE — 93010 EKG 12-LEAD: ICD-10-PCS | Mod: HCNC,,, | Performed by: INTERNAL MEDICINE

## 2019-09-28 PROCEDURE — 80061 LIPID PANEL: CPT | Mod: HCNC

## 2019-09-28 PROCEDURE — 83735 ASSAY OF MAGNESIUM: CPT | Mod: HCNC

## 2019-09-28 RX ORDER — HYDRALAZINE HYDROCHLORIDE 25 MG/1
25 TABLET, FILM COATED ORAL EVERY 8 HOURS
Status: DISCONTINUED | OUTPATIENT
Start: 2019-09-28 | End: 2019-09-28

## 2019-09-28 RX ORDER — TAMSULOSIN HYDROCHLORIDE 0.4 MG/1
0.4 CAPSULE ORAL DAILY
Qty: 30 CAPSULE | Refills: 11 | Status: SHIPPED | OUTPATIENT
Start: 2019-09-28 | End: 2019-10-16

## 2019-09-28 RX ORDER — TAMSULOSIN HYDROCHLORIDE 0.4 MG/1
0.4 CAPSULE ORAL DAILY
Status: DISCONTINUED | OUTPATIENT
Start: 2019-09-28 | End: 2019-09-28 | Stop reason: HOSPADM

## 2019-09-28 RX ADMIN — CARBIDOPA AND LEVODOPA 1 SPLIT TABLET: 25; 100 TABLET ORAL at 02:09

## 2019-09-28 RX ADMIN — CARBIDOPA AND LEVODOPA 1 SPLIT TABLET: 25; 100 TABLET ORAL at 01:09

## 2019-09-28 RX ADMIN — HYDRALAZINE HYDROCHLORIDE 25 MG: 25 TABLET, FILM COATED ORAL at 09:09

## 2019-09-28 RX ADMIN — ASPIRIN 81 MG: 81 TABLET, COATED ORAL at 08:09

## 2019-09-28 RX ADMIN — OYSTER SHELL CALCIUM WITH VITAMIN D 1 TABLET: 500; 200 TABLET, FILM COATED ORAL at 10:09

## 2019-09-28 RX ADMIN — CARBIDOPA AND LEVODOPA 1 SPLIT TABLET: 25; 100 TABLET ORAL at 09:09

## 2019-09-28 RX ADMIN — CARBIDOPA AND LEVODOPA 1 SPLIT TABLET: 25; 100 TABLET ORAL at 10:09

## 2019-09-28 RX ADMIN — CARBIDOPA AND LEVODOPA 1 SPLIT TABLET: 25; 100 TABLET ORAL at 12:09

## 2019-09-28 RX ADMIN — CARBIDOPA AND LEVODOPA 1 TABLET: 25; 100 TABLET ORAL at 08:09

## 2019-09-28 RX ADMIN — SENNOSIDES, DOCUSATE SODIUM 1 TABLET: 50; 8.6 TABLET, FILM COATED ORAL at 08:09

## 2019-09-28 RX ADMIN — TAMSULOSIN HYDROCHLORIDE 0.4 MG: 0.4 CAPSULE ORAL at 02:09

## 2019-09-28 RX ADMIN — CARBIDOPA AND LEVODOPA 1 SPLIT TABLET: 25; 100 TABLET ORAL at 11:09

## 2019-09-28 RX ADMIN — OXYCODONE HYDROCHLORIDE AND ACETAMINOPHEN 500 MG: 500 TABLET ORAL at 08:09

## 2019-09-28 RX ADMIN — THERA TABS 1 TABLET: TAB at 07:09

## 2019-09-28 NOTE — ASSESSMENT & PLAN NOTE
"-admitted 09/27 due to new onset atrial arrhythmia >>> spontaneous conversion to NSR  -in the ED initial EKG aflutter rate controlled (EKG not documented in Epic system) with spontaneously converted into NSR without intervention, afebrile without leukocytosis, CTH unremarkable, troponin WNL, CXR unremarkable, CBC unremarkable, BMP with potassium 3.2 otherwise unremarkable, and TSH WNL  -NSR EKG noted QTc 333  -etiology of arrhythmia unknown; no evidence of infection, thyroid dysfunction, or dehydration  -recent addition of myrbetriq and zoloft on 09/21 per caregiver, patient has since not "been herself and more hallucinations" >>>  both medications discontinued due to side effect concerns  -TTE obtained noting concentric LVR, normal EF 65%, normal RV systolic function, nomral LV diastolic function, moderate LAE, PASP 30, and CVP 3  - monitored overnight, telemetry with significant ectopy/artifact   -tele reviewed with [facility], also agree tele events represent ectopy from parkinson tremors  -remained HDS over hospital course, lengthy discussion with patients family regarding plan of care  -family agreement, low dose apixaban (weight and age) initiated, family understands bleeding risk with falls and to seek medical treatment if fall to occur  -no BB due to bradycardia at rest  -continue home verapamil for BP management  -CHADSVASc 4, HAS-BLED 2  -30 day event monitor at ordered at DC per family request, understand if tremors are significant data will be limited and may not provide diagnostic results; advised discuss this with monitoring company prior to applying monitor   -PCP follow up advised   "

## 2019-09-28 NOTE — NURSING
Praveen Nunes called and reported pt. Had a 13 beat run of VT,pt. Resting quietly in bed asymptomatic,pt. Has been having a lot of artifact on monitor all night due to tremors.monitor will show rate in the 200's and when checked rate will be in the 80's. Pt. Denies any complaints of CP or SOB. DR ellis,will send someone to check on pt.

## 2019-09-28 NOTE — ASSESSMENT & PLAN NOTE
-chronic  -continue home carbidopa/levodopa >> recent dose change   -follows with Neurology outpt >> follow up advised

## 2019-09-28 NOTE — DISCHARGE SUMMARY
"[facility] Hospital Medicine  Discharge Summary      Patient Name: Gisselle Oseguera  MRN: [id number]  Admission Date: 9/27/2019  Hospital Length of Stay: 0 days  Discharge Date and Time:  09/28/2019 2:58 PM  Attending Physician: Jayjay Pearson MD   Discharging Provider: Zoe Dove NP  Primary Care Provider: Philip Karimi MD  [facility] Medicine Team: [facility] HOSP MED  Zoe Dove NP    HPI:   Ms. Oseguera is an 88 year old female with past medical history of Parkinson's, HTN, HLD, OAB, and lung nodules (seen by pulm, low risk for malignancy), who presented to ER for syncope. Per Caregiver report the patient was assisted to the bathroom and while sitting on the toilet she had a syncopal episode versus seizure (did have shaking activity however does have parkinsons). Caregiver reported symptoms lasted about 3 minutes, after patient stopped shaking she did not feel the patient was breathing and did some chest compressions, she did not check for a pulse at that time. The patient came back to her baseline mentally within the next minute and has remained at baseline since. She had recent Neurology visit 09/04/2019 where carbidopa-levodopa regimen was altered with addition of sertraline and myrbetriq; per caregiver did not begin taking medications until 09/21. Per caregiver she denies recent decrease in PO intake, cold symptoms, concerns for UTI. She does endorse recent increase in hallucinations and " not quite herself" since beginning new medications last Saturday. She has 24/7 caregivers, uses rolling walker PRN, and has had near falls in the past. All past medical, social, and family history reviewed.     In ER: Patient noted to be in aflutter rate controlled on initial EKG (EKG not documented in Epic system). Spontaneously converted into NSR without intervention. Afebrile without leukocytosis. CTH unremarkable. Troponin WNL. CXR unremarkable. CBC unremarkable. BMP with potassium 3.2 " otherwise unremarkable. TSH WNL. The patient was placed in observation to the Hospital Medicine Service for further evaluation and treatment.     * No surgery found *      Hospital Course:   Ms. Oseguera was placed in observation 09/27 due to new onset atrial arrhythmia. In the ED initial EKG aflutter rate controlled (EKG not documented in Epic system) with spontaneously converted into NSR without intervention, afebrile without leukocytosis, CTH unremarkable, troponin WNL, CXR unremarkable, CBC , unremarkable, BMP with potassium 3.2 otherwise unremarkable, and TSH WNL. TTE obtained noting concentric LVR, normal EF 65%, normal RV systolic function, nomral LV diastolic function, moderate LAE, PASP 30, and CVP 3. She was monitored overnight, telemetry with significant ectopy. Tele reviewed with [facility], also agree tele events represent ectopy from parkinson tremors. She remained HDS over hospital course, lengthy discussion with patients family regarding plan of care. With family agreement, low dose apixaban (weight and age) initiated, family understands bleeding risk with falls and to seek medical treatment if fall to occur. No BB due to bradycardia at rest. Continue home verapamil for BP management. Continue home sinemet, home zolft and myrbetriq discontinued to concern for SE profile. Flomax initiated due to family's description of urinary retention throughout the day at home.     Disposition: home with family, 24/7 caregiver support, no further home needs identified, advised outpt follow up with PCP and Neurology.      * Atrial flutter, paroxysmal  -admitted 09/27 due to new onset atrial arrhythmia >>> spontaneous conversion to NSR  -in the ED initial EKG aflutter rate controlled (EKG not documented in Epic system) with spontaneously converted into NSR without intervention, afebrile without leukocytosis, CTH unremarkable, troponin WNL, CXR unremarkable, CBC unremarkable, BMP with potassium 3.2 otherwise  "unremarkable, and TSH WNL  -NSR EKG noted QTc 333  -etiology of arrhythmia unknown; no evidence of infection, thyroid dysfunction, or dehydration  -recent addition of myrbetriq and zoloft on 09/21 per caregiver, patient has since not "been herself and more hallucinations" >>>  both medications discontinued due to side effect concerns  -TTE obtained noting concentric LVR, normal EF 65%, normal RV systolic function, nomral LV diastolic function, moderate LAE, PASP 30, and CVP 3  - monitored overnight, telemetry with significant ectopy/artifact   -tele reviewed with [facility], also agree tele events represent ectopy from parkinson tremors  -remained HDS over hospital course, lengthy discussion with patients family regarding plan of care  -family agreement, low dose apixaban (weight and age) initiated, family understands bleeding risk with falls and to seek medical treatment if fall to occur  -no BB due to bradycardia at rest  -continue home verapamil for BP management  -CHADSVASc 4, HAS-BLED 2  -30 day event monitor at ordered at DC per family request, understand if tremors are significant data will be limited and may not provide diagnostic results; advised discuss this with monitoring company prior to applying monitor   -PCP follow up advised     Syncope-resolved as of 9/28/2019  -likely relate to new onset arrhythmia versus +/- vasovagal response in setting of recent medication changes noted above  -CTH negative, appears hydrated and compensated on exam, HDS  -fall and delirium precautions advised     Idiopathic Parkinson's disease  -chronic  -continue home carbidopa/levodopa >> recent dose change   -follows with Neurology outpt >> follow up advised     HTN (hypertension)  -chronic, elevated on arrival some improvement over course, WNL at DC  -continue home verapamil  -further monitoring and management per PCP     Aortic atherosclerosis  -chronic  -no home statin, likely due to age  -lipid panel " controlled    Impaired functional mobility, balance, gait, and endurance  -fall precautions advised  -24/7 caregiver support, uses rolling walker PRN    Urinary retention with incomplete bladder emptying  -myrbetriq discontinued as noted above  -flomax initiated  -PCP and Neurology follow up advised       Final Active Diagnoses:    Diagnosis Date Noted POA    PRINCIPAL PROBLEM:  Atrial flutter, paroxysmal [I48.92] 09/27/2019 Yes    Idiopathic Parkinson's disease [G20] 08/15/2012 Yes    HTN (hypertension) [I10] 06/27/2013 Yes    Aortic atherosclerosis [I70.0] 09/19/2014 Yes    Impaired functional mobility, balance, gait, and endurance [Z74.09] 08/03/2018 Yes    Urinary retention with incomplete bladder emptying [R33.9] 09/28/2019 Unknown      Problems Resolved During this Admission:    Diagnosis Date Noted Date Resolved POA    Syncope [R55] 09/19/2014 09/28/2019 Yes     Discharged Condition: good    Disposition: Home or Self Care    Follow Up:  Follow-up Information     Schedule an appointment as soon as possible for a visit with Philip Karimi MD.    Specialty:  Internal Medicine  Why:  for hospital follow up and monitoring of new medications  Contact information:  [address] [facility] [address]  [phone]             Jayjay Quinones MD. Schedule an appointment as soon as possible for a visit in 2 weeks.    Specialty:  Neurology  Why:  for follow up on Parkinson's management and medication changes since last visit  Contact information:  [address]  [facility] [address]  [phone]                 Patient Instructions:      Diet Adult Regular     Notify your health care provider if you experience any of the following:  temperature >100.4     Notify your health care provider if you experience any of the following:  persistent nausea and vomiting or diarrhea     Notify your health care provider if you experience any of the following:  difficulty breathing or increased cough     Notify  your health care provider if you experience any of the following:  severe persistent headache     Notify your health care provider if you experience any of the following:  persistent dizziness, light-headedness, or visual disturbances     Notify your health care provider if you experience any of the following:  increased confusion or weakness     Notify your health care provider if you experience any of the following:  severe uncontrolled pain     Cardiac event monitor   Standing Status: Future Standing Exp. Date: 09/28/20     Order Specific Question Answer Comments   Cardiac Event Monitor Auto Trigger      Activity as tolerated     Review of Systems   Unable to perform ROS: Dementia     Physical Exam   Constitutional: She is oriented to person, place, and time. She appears well-developed and well-nourished. No distress.   HENT:   Head: Normocephalic and atraumatic.   Mouth/Throat: Mucous membranes are normal. Normal dentition. No oropharyngeal exudate.   Eyes: Conjunctivae, EOM and lids are normal.   Neck: Normal range of motion. Neck supple. No JVD present.   Cardiovascular: Regular rhythm, normal heart sounds and intact distal pulses. Bradycardia present.   No murmur heard.  Pulmonary/Chest: Effort normal and breath sounds normal. She exhibits no tenderness.   Abdominal: Soft. Bowel sounds are normal. She exhibits no distension. There is no tenderness.   Musculoskeletal: Normal range of motion. She exhibits no edema or deformity.   Neurological: She is alert and oriented to person, place, and time. No cranial nerve deficit.   Skin: Skin is warm and dry. No rash noted. She is not diaphoretic. No erythema.   Psychiatric: She has a normal mood and affect. Judgment and thought content normal.   Nursing note and vitals reviewed.    Significant Diagnostic Studies: Labs:   BMP:   Recent Labs   Lab 09/27/19  1013 09/28/19  0515    87    137   K 3.2* 5.4*    106   CO2 27 24   BUN 15 16   CREATININE 0.8  0.7   CALCIUM 9.5 9.5   MG 1.9 2.0   , CBC   Recent Labs   Lab 09/27/19  1013   WBC 7.46   HGB 12.4   HCT 38.6       and All labs within the past 24 hours have been reviewed    Pending Diagnostic Studies:     None         Medications:  Reconciled Home Medications:      Medication List      START taking these medications    ELIQUIS 2.5 mg Tab  Generic drug:  apixaban  Take 1 tablet (2.5 mg total) by mouth 2 (two) times daily.     tamsulosin 0.4 mg Cap  Commonly known as:  FLOMAX  Take 1 capsule (0.4 mg total) by mouth once daily.        CONTINUE taking these medications    aspirin 81 MG EC tablet  Commonly known as:  ECOTRIN  Take 81 mg by mouth once daily.     calcium-vitamin D3 500 mg(1,250mg) -200 unit per tablet  Commonly known as:  OS-CHANDA 500 + D3  Take 1 tablet by mouth 2 (two) times daily with meals.     carbidopa-levodopa  mg  mg per tablet  Commonly known as:  SINEMET  Take up to 12 tablets divided as instructed     donepezil 10 MG tablet  Commonly known as:  ARICEPT  Take 1 tablet (10 mg total) by mouth every evening.     multivitamin with minerals tablet     STOOL SOFTENER ORAL  Take 1 tablet by mouth daily as needed.     verapamil 180 MG CR tablet  Commonly known as:  CALAN-SR  TAKE 1 TABLET ONE TIME DAILY     VITAMIN C 500 MG tablet  Generic drug:  ascorbic acid (vitamin C)  Take 500 mg by mouth once daily.        STOP taking these medications    citalopram 10 MG tablet  Commonly known as:  CELEXA     FLUZONE HIGH-DOSE 2018-19 (PF) 180 mcg/0.5 mL vaccine  Generic drug:  influenza     mirabegron 25 mg Tb24 ER tablet  Commonly known as:  MYRBETRIQ     pravastatin 40 MG tablet  Commonly known as:  PRAVACHOL     sertraline 25 MG tablet  Commonly known as:  ZOLOFT     vitamin A palmitate-vitamin D2 10,000-400 unit Tab          Indwelling Lines/Drains at time of discharge:   Lines/Drains/Airways     Drain            Female External Urinary Catheter 09/27/19 1046 1 day              Time spent  on the discharge of patient: 70 minutes  Patient was seen and examined on the date of discharge and determined to be suitable for discharge.      Zoe Dove, PATRICIA, AG-ACNP, BC  Department of Hospital Medicine  [facility]

## 2019-09-28 NOTE — HOSPITAL COURSE
Ms. Oseguera was placed in observation 09/27 due to new onset atrial arrhythmia. In the ED initial EKG aflutter rate controlled (EKG not documented in Epic system) with spontaneously converted into NSR without intervention, afebrile without leukocytosis, CTH unremarkable, troponin WNL, CXR unremarkable, CBC, unremarkable, BMP with potassium 3.2 otherwise unremarkable, and TSH WNL. TTE obtained noting concentric LVR, normal EF 65%, normal RV systolic function, nomral LV diastolic function, moderate LAE, PASP 30, and CVP 3. She was monitored overnight, telemetry with significant ectopy. Tele reviewed with [facility], also agree tele events represent ectopy from parkinson tremors. She remained HDS over hospital course, lengthy discussion with patients family regarding plan of care. With family agreement, low dose apixaban (weight and age) initiated, family understands bleeding risk with falls and to seek medical treatment if fall to occur. No BB due to bradycardia at rest. Continue home verapamil for BP management. Continue home sinemet, home zolft and myrbetriq discontinued to concern for SE profile. Flomax initiated due to family's description of urinary retention throughout the day at home.     Disposition: home with family, 24/7 caregiver support, no further home needs identified, advised outpt follow up with PCP and Neurology.

## 2019-09-28 NOTE — NURSING
Pt and daughter given discharge instructions and follow up appointments to keep. Daughter verbalized understanding of all . Prescriptions have been delivered to bedside.

## 2019-09-28 NOTE — NURSING
Received to room 03 per stretcher in NAD accompanied by care giver,pt. Awake alert and oriented to person and able to state birthday and knows she's in a hospital. Pt. Has a history of Parkinsons and has tremors to upper extremities. Tele monitor on but not working,monitor room called for another monitor. Family at bedside. Oriented to room and call bell system

## 2019-09-28 NOTE — ASSESSMENT & PLAN NOTE
-likely relate to new onset arrhythmia versus +/- vasovagal response in setting of recent medication changes noted above  -CTH negative, appears hydrated and compensated on exam, HDS  -fall and delirium precautions advised

## 2019-09-28 NOTE — ASSESSMENT & PLAN NOTE
-chronic, elevated on arrival some improvement over course, WNL at DC  -continue home verapamil  -further monitoring and management per PCP

## 2019-09-30 ENCOUNTER — PATIENT MESSAGE (OUTPATIENT)
Dept: INTERNAL MEDICINE | Facility: CLINIC | Age: 84
End: 2019-09-30

## 2019-10-01 ENCOUNTER — PATIENT MESSAGE (OUTPATIENT)
Dept: INTERNAL MEDICINE | Facility: CLINIC | Age: 84
End: 2019-10-01

## 2019-10-09 ENCOUNTER — PATIENT MESSAGE (OUTPATIENT)
Dept: INTERNAL MEDICINE | Facility: CLINIC | Age: 84
End: 2019-10-09

## 2019-10-10 ENCOUNTER — PES CALL (OUTPATIENT)
Dept: ADMINISTRATIVE | Facility: CLINIC | Age: 84
End: 2019-10-10

## 2019-10-16 ENCOUNTER — IMMUNIZATION (OUTPATIENT)
Dept: PHARMACY | Facility: CLINIC | Age: 84
End: 2019-10-16
Payer: MEDICARE

## 2019-10-16 ENCOUNTER — OFFICE VISIT (OUTPATIENT)
Dept: INTERNAL MEDICINE | Facility: CLINIC | Age: 84
End: 2019-10-16
Payer: MEDICARE

## 2019-10-16 VITALS
HEIGHT: 63 IN | BODY MASS INDEX: 20 KG/M2 | OXYGEN SATURATION: 97 % | DIASTOLIC BLOOD PRESSURE: 72 MMHG | WEIGHT: 112.88 LBS | HEART RATE: 64 BPM | SYSTOLIC BLOOD PRESSURE: 138 MMHG

## 2019-10-16 DIAGNOSIS — Z91.81 AT HIGH RISK FOR INJURY RELATED TO FALL: ICD-10-CM

## 2019-10-16 DIAGNOSIS — R41.3 MEMORY LOSS: ICD-10-CM

## 2019-10-16 DIAGNOSIS — G20.A1 IDIOPATHIC PARKINSON'S DISEASE: ICD-10-CM

## 2019-10-16 DIAGNOSIS — R42 DIZZINESS: ICD-10-CM

## 2019-10-16 DIAGNOSIS — I48.0 PAROXYSMAL A-FIB: Primary | ICD-10-CM

## 2019-10-16 PROCEDURE — 99999 PR PBB SHADOW E&M-EST. PATIENT-LVL IV: CPT | Mod: PBBFAC,HCNC,, | Performed by: INTERNAL MEDICINE

## 2019-10-16 PROCEDURE — 99999 PR PBB SHADOW E&M-EST. PATIENT-LVL IV: ICD-10-PCS | Mod: PBBFAC,HCNC,, | Performed by: INTERNAL MEDICINE

## 2019-10-16 PROCEDURE — 1101F PT FALLS ASSESS-DOCD LE1/YR: CPT | Mod: HCNC,CPTII,S$GLB, | Performed by: INTERNAL MEDICINE

## 2019-10-16 PROCEDURE — 1101F PR PT FALLS ASSESS DOC 0-1 FALLS W/OUT INJ PAST YR: ICD-10-PCS | Mod: HCNC,CPTII,S$GLB, | Performed by: INTERNAL MEDICINE

## 2019-10-16 PROCEDURE — 99214 OFFICE O/P EST MOD 30 MIN: CPT | Mod: HCNC,S$GLB,, | Performed by: INTERNAL MEDICINE

## 2019-10-16 PROCEDURE — 99214 PR OFFICE/OUTPT VISIT, EST, LEVL IV, 30-39 MIN: ICD-10-PCS | Mod: HCNC,S$GLB,, | Performed by: INTERNAL MEDICINE

## 2019-10-16 PROCEDURE — 99499 RISK ADDL DX/OHS AUDIT: ICD-10-PCS | Mod: HCNC,S$GLB,, | Performed by: INTERNAL MEDICINE

## 2019-10-16 PROCEDURE — 99499 UNLISTED E&M SERVICE: CPT | Mod: HCNC,S$GLB,, | Performed by: INTERNAL MEDICINE

## 2019-10-16 NOTE — PROGRESS NOTES
Subjective:       Patient ID: Gisselle Oseguera is a 88 y.o. female.    Chief Complaint: Hospital Follow Up    HPI:  Patient here with her son and daughter for follow-up of hospitalization for AFib.  It seems to be paroxysmal but the picture is complicated by her advanced age and parkinsonism, decreased coordination in history of weak spells, possible increased risk for falling.  She was placed on Eliquis in the family is concerned because of bleeding risk.  There was a question of whether a bladder medicine could have triggered the spell.  After discussing with several family members in reviewing the chart the Myrbetriq does not seem to have been started for financial reasons and it looks like the Flomax was started after the spells in the hospital.  The it was seemingly causing dizziness in the family including the daughter who is a nurse stopped the Flomax and the patient feels less dizzy.  They do not think it was helping her urine much so they have elected to stay off of it.  The see Neurology regularly.  A 30 day monitor was ordered in the hospital but was never obtained so we will probably proceed with Cardiology evaluation to see if they think it is necessary.  The patient and family would also like a case management evaluation to see about safety, possible PT OT, evaluation of medical devices.    Review of Systems   Constitutional: Positive for activity change. Negative for fever.   Respiratory: Negative for choking and chest tightness.    Cardiovascular: Negative for chest pain, palpitations and leg swelling.   Musculoskeletal: Positive for arthralgias, gait problem and myalgias.   Neurological: Positive for dizziness ( improved in the last week), tremors, syncope, weakness and light-headedness (Improved in the last week).       Objective:      Physical Exam   Constitutional: She appears well-developed and well-nourished.   HENT:   Head: Normocephalic and atraumatic.   Cardiovascular: Normal rate, regular  rhythm and normal heart sounds.   Pulmonary/Chest: Effort normal and breath sounds normal. No stridor. No respiratory distress.   Abdominal: Soft. Bowel sounds are normal. She exhibits no distension. There is no tenderness.   Musculoskeletal: She exhibits deformity (Fingers). She exhibits no edema or tenderness.   Neurological: She is alert.   Skin: No erythema. No pallor.   Psychiatric: She has a normal mood and affect. Her behavior is normal.       Assessment:       1. Paroxysmal A-fib    2. At high risk for injury related to fall    3. Idiopathic Parkinson's disease    4. Memory loss    5. Dizziness        Plan:       Gisselle was seen today for hospital follow up.    Diagnoses and all orders for this visit:    Paroxysmal A-fib  -     Ambulatory referral to Cardiology    At high risk for injury related to fall  -     Ambulatory referral to Outpatient Case Management    Idiopathic Parkinson's disease  -     Ambulatory referral to Outpatient Case Management    Memory loss  -     Ambulatory referral to Outpatient Case Management    Dizziness  -     Ambulatory referral to Cardiology    Other orders  -     apixaban (ELIQUIS) 2.5 mg Tab; Take 1 tablet (2.5 mg total) by mouth 2 (two) times daily.            To Cardiology: Question if the afib needs the 30 day monitor? Pt has a fall risk from Parkinsonism. How critical is blood thinner?   Any other factors to consider.

## 2019-10-17 ENCOUNTER — PATIENT OUTREACH (OUTPATIENT)
Dept: ADMINISTRATIVE | Facility: OTHER | Age: 84
End: 2019-10-17

## 2019-10-18 ENCOUNTER — HOSPITAL ENCOUNTER (OUTPATIENT)
Dept: CARDIOLOGY | Facility: CLINIC | Age: 84
Discharge: HOME | End: 2019-10-18
Payer: MEDICARE

## 2019-10-18 ENCOUNTER — OFFICE VISIT (OUTPATIENT)
Dept: CARDIOLOGY | Facility: CLINIC | Age: 84
End: 2019-10-18
Payer: MEDICARE

## 2019-10-18 ENCOUNTER — TELEPHONE (OUTPATIENT)
Dept: CARDIOLOGY | Facility: CLINIC | Age: 84
End: 2019-10-18

## 2019-10-18 VITALS
WEIGHT: 112.88 LBS | HEART RATE: 60 BPM | HEIGHT: 63 IN | SYSTOLIC BLOOD PRESSURE: 122 MMHG | DIASTOLIC BLOOD PRESSURE: 58 MMHG | BODY MASS INDEX: 20 KG/M2

## 2019-10-18 DIAGNOSIS — R42 DIZZINESS: Primary | ICD-10-CM

## 2019-10-18 DIAGNOSIS — I48.91 ATRIAL FIBRILLATION, UNSPECIFIED TYPE: ICD-10-CM

## 2019-10-18 DIAGNOSIS — I48.91 ATRIAL FIBRILLATION, UNSPECIFIED TYPE: Primary | ICD-10-CM

## 2019-10-18 PROCEDURE — 99499 RISK ADDL DX/OHS AUDIT: ICD-10-PCS | Mod: HCNC,S$GLB,, | Performed by: INTERNAL MEDICINE

## 2019-10-18 PROCEDURE — 99999 PR PBB SHADOW E&M-EST. PATIENT-LVL IV: CPT | Mod: PBBFAC,HCNC,GC, | Performed by: STUDENT IN AN ORGANIZED HEALTH CARE EDUCATION/TRAINING PROGRAM

## 2019-10-18 PROCEDURE — 93000 EKG 12-LEAD: ICD-10-PCS | Mod: HCNC,S$GLB,, | Performed by: INTERNAL MEDICINE

## 2019-10-18 PROCEDURE — 93000 ELECTROCARDIOGRAM COMPLETE: CPT | Mod: HCNC,S$GLB,, | Performed by: INTERNAL MEDICINE

## 2019-10-18 PROCEDURE — 99203 PR OFFICE/OUTPT VISIT, NEW, LEVL III, 30-44 MIN: ICD-10-PCS | Mod: HCNC,GC,S$GLB, | Performed by: STUDENT IN AN ORGANIZED HEALTH CARE EDUCATION/TRAINING PROGRAM

## 2019-10-18 PROCEDURE — 1101F PT FALLS ASSESS-DOCD LE1/YR: CPT | Mod: HCNC,CPTII,GC,S$GLB | Performed by: STUDENT IN AN ORGANIZED HEALTH CARE EDUCATION/TRAINING PROGRAM

## 2019-10-18 PROCEDURE — 1101F PR PT FALLS ASSESS DOC 0-1 FALLS W/OUT INJ PAST YR: ICD-10-PCS | Mod: HCNC,CPTII,GC,S$GLB | Performed by: STUDENT IN AN ORGANIZED HEALTH CARE EDUCATION/TRAINING PROGRAM

## 2019-10-18 PROCEDURE — 99999 PR PBB SHADOW E&M-EST. PATIENT-LVL IV: ICD-10-PCS | Mod: PBBFAC,HCNC,GC, | Performed by: STUDENT IN AN ORGANIZED HEALTH CARE EDUCATION/TRAINING PROGRAM

## 2019-10-18 PROCEDURE — 99203 OFFICE O/P NEW LOW 30 MIN: CPT | Mod: HCNC,GC,S$GLB, | Performed by: STUDENT IN AN ORGANIZED HEALTH CARE EDUCATION/TRAINING PROGRAM

## 2019-10-18 PROCEDURE — 99499 UNLISTED E&M SERVICE: CPT | Mod: HCNC,S$GLB,, | Performed by: INTERNAL MEDICINE

## 2019-10-18 NOTE — PROGRESS NOTES
Cardiology Clinic Note  Reason for Visit: Dizziness, OAC   Referring MD: Dr. Karimi  Date of Visit: 10/18/2019    HPI:     Gisselle Oseguera is a 88 y.o. F with Parkinson's disease, h/o HTN who presents for follow up for her dizziness and to further discuss need for OAC in pt with high fall risk. Pt presents in a wheelchair with 2 of her (10) children who report that patient has as long history of feeling lightheaded and dizziness (no vertigo) that would occur spontaneously and resolve by itself in a few mins. Over the last few months, these episodes have become more frequent and are associated with spontaneous onset without any presyncopal symptoms and LOC/sluggish to respond. Most recently, pt was getting up from her kitchen table when she had a sudden LOC. She was brought to the ER, diagnosed with atrial flutter (not available in Epic system) and admitted for overnight observation. She was started on apixaban 2.5mg BID prior and tamsulosin 0.4mg for this atrial arrhythmia and urinary retention, respectively, prior to discharge    Since discharge, pt has reportedly had 2 other events. Daughter presents BP at home of 80-90/40-50. She has held her flomax (since last week) and verapamil (since this week) and reports no new episodes so far. Patient denies any room spinning to suggest vertigo, focal defecits or blurred vision to suggest stroke. Her 2D ECHO was wnl.     ROS:    Constitution: Negative for fever or chills. Negative for weight loss or gain.   HENT: Negative for sore throat or headaches. Negative for rhinorrhea.  Eyes: Negative for blurred or double vision.   Cardiovascular: See above  Pulmonary: Negative for SOB. Negative for cough.   Gastrointestinal: Negative for abdominal pain. Negative for nausea/ vomiting. Negative for diarrhea.   : Negative for dysuria.   Neurological: Negative for focal weakness or sensory changes.  PMH:     Past Medical History:   Diagnosis Date    Arthritis     Basal cell  carcinoma     Cataract     Depression     Fever blister     High cholesterol     HTN (hypertension) 2013    Hypertension     Memory loss     Parkinsonism      Past Surgical History:   Procedure Laterality Date    CATARACT EXTRACTION      HYSTERECTOMY      TONSILLECTOMY       Allergies:   Review of patient's allergies indicates:  No Known Allergies  Medications:     Current Outpatient Medications on File Prior to Visit   Medication Sig Dispense Refill    ascorbic acid, vitamin C, (VITAMIN C) 500 MG tablet Take 500 mg by mouth once daily.      aspirin (ECOTRIN) 81 MG EC tablet Take 81 mg by mouth once daily.      calcium-vitamin D3 500 mg(1,250mg) -200 unit per tablet Take 1 tablet by mouth 2 (two) times daily with meals.      carbidopa-levodopa  mg (SINEMET)  mg per tablet Take up to 12 tablets divided as instructed 1260 tablet 3    DOCUSATE CALCIUM (STOOL SOFTENER ORAL) Take 1 tablet by mouth daily as needed.       donepezil (ARICEPT) 10 MG tablet Take 1 tablet (10 mg total) by mouth every evening. 90 tablet 3    [DISCONTINUED] apixaban (ELIQUIS) 2.5 mg Tab Take 1 tablet (2.5 mg total) by mouth 2 (two) times daily. 60 tablet 5    [DISCONTINUED] verapamil (CALAN-SR) 180 MG CR tablet TAKE 1 TABLET ONE TIME DAILY 90 tablet 3    [] flu vacc [id number],65yr up,PF (FLUZONE HIGH-DOSE , PF,) 180 mcg/0.5 mL Syrg Inject 0.5 mLs into the muscle once. For one dose. for 1 dose 0.5 mL 0    multivitamin with minerals tablet        No current facility-administered medications on file prior to visit.      Social History:     Social History     Tobacco Use    Smoking status: Former Smoker     Last attempt to quit: 8/3/1968     Years since quittin.2    Smokeless tobacco: Never Used   Substance Use Topics    Alcohol use: Yes     Comment: social     Family History:     Family History   Problem Relation Age of Onset    Cancer Mother 80        colon    Macular degeneration  "Paternal Aunt     No Known Problems Father     No Known Problems Sister     No Known Problems Brother     No Known Problems Maternal Aunt     No Known Problems Maternal Uncle     No Known Problems Paternal Uncle     No Known Problems Maternal Grandmother     No Known Problems Maternal Grandfather     No Known Problems Paternal Grandmother     No Known Problems Paternal Grandfather     Melanoma Neg Hx     Blindness Neg Hx     Glaucoma Neg Hx     Retinal detachment Neg Hx     Amblyopia Neg Hx     Cataracts Neg Hx     Diabetes Neg Hx     Hypertension Neg Hx     Strabismus Neg Hx     Stroke Neg Hx     Thyroid disease Neg Hx      Physical Exam:   BP (!) 122/58 (BP Location: Left arm, Patient Position: Sitting, BP Method: Medium (Automatic))   Pulse 60   Ht 5' 3" (1.6 m)   Wt 51.2 kg (112 lb 14 oz)   BMI 20.00 kg/m²      Orthostatic vitals:   Sitting 110/80; standing 132/60 with HR increased 70 -> 143 bpm    Constitutional: No acute distress, conversant. Has lapses in conversation intermittently. Presents in wheelchair   Neck: No JVD, no carotid bruits  Cardiovascular: regular rate and rhythm, no murmur, rubs or gallops, normal S1/S2  Pulmonary: Clear to auscultation bilaterally  Abdominal: soft, nontender, nondistended, positive bowel sounds  Extremities: No lower extremity edema,   Pulses: 2+ BL Radial, 2+ BL carotid, 2+ BL DP, 2+ BL PT  Skin: No ecchymosis, erythema, or ulcers  Psych: Alert and oriented x 3, appropriate affect  Neuro: essential/resting tremor present.    Labs:     Lab Results   Component Value Date     09/28/2019    K 5.4 (H) 09/28/2019     09/28/2019    CO2 24 09/28/2019    BUN 16 09/28/2019    CREATININE 0.7 09/28/2019    ANIONGAP 7 (L) 09/28/2019     Lab Results   Component Value Date    AST 9 (L) 04/04/2019    ALT <5 (L) 04/04/2019    ALKPHOS 68 04/04/2019    BILITOT 0.6 04/04/2019    ALBUMIN 3.6 04/04/2019     Lab Results   Component Value Date    CALCIUM 9.5 " 09/28/2019    MG 2.0 09/28/2019     No results found for: BNP, BNPTRIAGEBLO Lab Results   Component Value Date    WBC 7.46 09/27/2019    HGB 12.4 09/27/2019    HCT 38.6 09/27/2019     09/27/2019    GRAN 5.3 09/27/2019    GRAN 70.7 09/27/2019     Lab Results   Component Value Date    INR 1.0 09/27/2019     Lab Results   Component Value Date    CHOL 162 09/28/2019    HDL 54 09/28/2019    LDLCALC 90.6 09/28/2019    TRIG 87 09/28/2019     Lab Results   Component Value Date    HGBA1C 5.6 02/01/2016     Lab Results   Component Value Date    TSH 3.851 09/27/2019    [id number] 7.3 04/11/2012          Imaging:   EKG 10/18/19: sinus arrhythmia 63 bpm    ECHO 9/27/19  · Concentric left ventricular remodeling.  · Normal left ventricular systolic function. The estimated ejection fraction is 65%  · Normal right ventricular systolic function.  · Normal LV diastolic function.  · Moderate left atrial enlargement.  · The estimated PA systolic pressure is 30 mm Hg  · Normal central venous pressure (3 mm Hg).    Assessment:     1. Dizziness  Cardiac event monitor     Plan:     Gisselle Oseguera is a 88 y.o. F with Parkinsons, h/o HTN who presents for follow up for dizziness. She was recently diagnosed with atrial flutter during her hospitalization 9/26-9/27, however per evaluation of 12-leads, she did not have atrial flutter. She likely has artifact due to essential tremor from Parkinson's Disease. As such does not need apixaban at this time. Plan for 30-day event monitor and if atrial arrhythmias are detected, will discuss % burden and risk/benefit of OAC in patient with high fall risk.     1. Syncope  - pt is relatively hypotensive but does not have orthostatic hypotension. Will stop verapamil but no indication for midodrine at this time  - cardiac event monitor to eval for underlying atrial arrhythmias or high grade AV block    Patient seen and plan of care discussed with Dr. Carter.    RTC in 4-6 weeks to follow up results of  cardiac event monitor and symptoms    Signed:  Brea Grover MD  Cardiology Fellow PGY 4  Beeper:  [phone]

## 2019-10-21 NOTE — PROGRESS NOTES
I have reviewed the fellow's history and physical and discussed the case with the fellow. I have personally spoken with and examined the patient in the clinic. I agree with the findings, assessment, and plan.  Review of ECGs from ED visit shows sinus rhythm with PACs.  The interpretation of atrial flutter was erroneous as baseline artifact from he r Parkinson's tremor accounts for the ECG deflections interpreted as flutter waves.  Therefore, agree with stopping OAC at this time.

## 2019-10-22 ENCOUNTER — TELEPHONE (OUTPATIENT)
Dept: INTERNAL MEDICINE | Facility: CLINIC | Age: 84
End: 2019-10-22

## 2019-10-22 ENCOUNTER — OUTPATIENT CASE MANAGEMENT (OUTPATIENT)
Dept: ADMINISTRATIVE | Facility: OTHER | Age: 84
End: 2019-10-22

## 2019-10-22 NOTE — PROGRESS NOTES
LMSW received a referral on the above patient to assists with evaluation to see about safety, possible PT OT, evaluation of medical devices. LMSW contacted patient home and spoke with caregiver Meri Hazel. Meri reports patient daughter and son was in the impression someone will come to the home to evaluate the patient for services. Caregiver reports patient has parkinson's and is a fall risk. Caregiver reports family wanted a safety assessment  completed to prevent falls. Caregiver also reports patient needs to be asssed for home medical equipment such as a nurys lift and possible SN to check vitals etc. LMSW explained to caregiver patient may benefit from a HH consult . LMSW sent an in basket message to PCP to please place orders if an agreement with request.       Plan :  LMSW will follow up on tomorrow to verify orders have been placed.

## 2019-10-22 NOTE — TELEPHONE ENCOUNTER
"----- Message from Angela Alberto LMSW sent at 10/22/2019 12:38 PM CDT -----  This LMSW received a referral on the above patient to assist with " safety, possible PT OT, evaluation of medical devices. SW contacted patient caregiver Meri Hazel. Meri Hazel reports she is patient caregiver. Meri reports family is seeking someone to come out to the home to complete a safety evaluation to prevent falls, A SN to assist with compliance with medication etc, OT PT to make recommendation on DME. Dr. Stewart I believe patient will benefit from a HH consult. Please place orders for HH if an agreement with request.      Thank you for the referral,    Angela Alberto LMSW     "

## 2019-10-24 ENCOUNTER — OUTPATIENT CASE MANAGEMENT (OUTPATIENT)
Dept: ADMINISTRATIVE | Facility: OTHER | Age: 84
End: 2019-10-24

## 2019-10-24 NOTE — PROGRESS NOTES
LMSW following up on in basket message sent to PCP regarding HH orders. LMSW awaiting orders to follow up with patient family

## 2019-10-30 ENCOUNTER — TELEPHONE (OUTPATIENT)
Dept: INTERNAL MEDICINE | Facility: CLINIC | Age: 84
End: 2019-10-30

## 2019-10-30 ENCOUNTER — OUTPATIENT CASE MANAGEMENT (OUTPATIENT)
Dept: ADMINISTRATIVE | Facility: OTHER | Age: 84
End: 2019-10-30

## 2019-10-30 DIAGNOSIS — G20.A1 IDIOPATHIC PARKINSON'S DISEASE: Primary | ICD-10-CM

## 2019-10-30 DIAGNOSIS — R41.3 MEMORY LOSS: ICD-10-CM

## 2019-10-30 DIAGNOSIS — Z74.09 IMPAIRED FUNCTIONAL MOBILITY, BALANCE, GAIT, AND ENDURANCE: ICD-10-CM

## 2019-10-30 DIAGNOSIS — Z91.81 AT HIGH RISK FOR INJURY RELATED TO FALL: ICD-10-CM

## 2019-10-30 DIAGNOSIS — I48.0 PAROXYSMAL A-FIB: ICD-10-CM

## 2019-10-30 NOTE — PROGRESS NOTES
[facility] and spoke with Andree . Benny reports no orders in the system at this current time. INDRA will send a follow up in basket message to PCP.

## 2019-10-30 NOTE — TELEPHONE ENCOUNTER
----- Message from Angela Alberto LMSW sent at 10/30/2019  3:35 PM CDT -----  This INDRA is following up on PT , SN orders for patient . INDRA contacted [facility]  and spoke with Benny regarding orders. Andree reports orders have not been received. Dr. Karimi can you please place orders if an agreement with request.     Thank you for the referral,    Angela Alberto LMSW

## 2019-10-31 ENCOUNTER — PATIENT MESSAGE (OUTPATIENT)
Dept: INTERNAL MEDICINE | Facility: CLINIC | Age: 84
End: 2019-10-31

## 2019-11-01 ENCOUNTER — OUTPATIENT CASE MANAGEMENT (OUTPATIENT)
Dept: ADMINISTRATIVE | Facility: OTHER | Age: 84
End: 2019-11-01

## 2019-11-01 PROCEDURE — G0180 MD CERTIFICATION HHA PATIENT: HCPCS | Mod: ,,, | Performed by: INTERNAL MEDICINE

## 2019-11-01 PROCEDURE — G0180 PR HOME HEALTH MD CERTIFICATION: ICD-10-PCS | Mod: ,,, | Performed by: INTERNAL MEDICINE

## 2019-11-01 NOTE — PROGRESS NOTES
INDRA followed up on  orders and spoke with Andree with [facility]  [phone]. Andree reports orders have been received and patient will be seen on today. INDRA will close case

## 2019-11-06 ENCOUNTER — PATIENT MESSAGE (OUTPATIENT)
Dept: INTERNAL MEDICINE | Facility: CLINIC | Age: 84
End: 2019-11-06

## 2019-11-07 ENCOUNTER — CLINICAL SUPPORT (OUTPATIENT)
Dept: CARDIOLOGY | Facility: HOSPITAL | Age: 84
End: 2019-11-07
Attending: STUDENT IN AN ORGANIZED HEALTH CARE EDUCATION/TRAINING PROGRAM
Payer: MEDICARE

## 2019-11-07 DIAGNOSIS — R42 DIZZINESS: ICD-10-CM

## 2019-11-07 PROCEDURE — 93272 CARDIAC EVENT MONITOR (CUPID ONLY): ICD-10-PCS | Mod: HCNC,,, | Performed by: INTERNAL MEDICINE

## 2019-11-07 PROCEDURE — 93272 ECG/REVIEW INTERPRET ONLY: CPT | Mod: HCNC,,, | Performed by: INTERNAL MEDICINE

## 2019-11-07 PROCEDURE — 93271 ECG/MONITORING AND ANALYSIS: CPT | Mod: HCNC

## 2019-11-12 ENCOUNTER — EXTERNAL HOME HEALTH (OUTPATIENT)
Dept: HOME HEALTH SERVICES | Facility: HOSPITAL | Age: 84
End: 2019-11-12
Payer: MEDICARE

## 2019-12-02 ENCOUNTER — PATIENT MESSAGE (OUTPATIENT)
Dept: NEUROLOGY | Facility: CLINIC | Age: 84
End: 2019-12-02

## 2019-12-02 ENCOUNTER — TELEPHONE (OUTPATIENT)
Dept: INTERNAL MEDICINE | Facility: CLINIC | Age: 84
End: 2019-12-02

## 2019-12-02 DIAGNOSIS — G20.A1 IDIOPATHIC PARKINSON'S DISEASE: ICD-10-CM

## 2019-12-02 DIAGNOSIS — Z74.09 IMPAIRED MOBILITY: Primary | ICD-10-CM

## 2019-12-02 DIAGNOSIS — R42 DIZZINESS: ICD-10-CM

## 2019-12-02 NOTE — TELEPHONE ENCOUNTER
----- Message from Melvin Ryan sent at 12/2/2019  1:47 PM CST -----  Contact: Van/ [facility]/[phone]  Pt could benefit from light weight wheelchair and needs it faxed to [facility] department ext [phone]. Please advise.          Thank You

## 2019-12-03 ENCOUNTER — TELEPHONE (OUTPATIENT)
Dept: NEUROLOGY | Facility: CLINIC | Age: 84
End: 2019-12-03

## 2019-12-03 ENCOUNTER — PATIENT OUTREACH (OUTPATIENT)
Dept: ADMINISTRATIVE | Facility: OTHER | Age: 84
End: 2019-12-03

## 2019-12-03 NOTE — TELEPHONE ENCOUNTER
----- Message from Tonya Dykes sent at 12/3/2019 12:22 PM CST -----  Contact: pts daughter/Kaley at [phone]  Joni pt-Pt is scheduled for tomorrow but unable lto make that appt.  Pleae call Daughter to reschedule the appt.  Thanks.  Also sent a message through the [facility].

## 2019-12-03 NOTE — TELEPHONE ENCOUNTER
Returned call to pt's daughter and she states they wanted to cancel, but informed we could switch appointment to video visit. She would like to moved appointment to video visit. She will confirmed with her brother and let us know.

## 2019-12-08 ENCOUNTER — PATIENT MESSAGE (OUTPATIENT)
Dept: NEUROLOGY | Facility: CLINIC | Age: 84
End: 2019-12-08

## 2019-12-19 ENCOUNTER — TELEPHONE (OUTPATIENT)
Dept: INTERNAL MEDICINE | Facility: CLINIC | Age: 84
End: 2019-12-19

## 2019-12-30 ENCOUNTER — PATIENT MESSAGE (OUTPATIENT)
Dept: INTERNAL MEDICINE | Facility: CLINIC | Age: 84
End: 2019-12-30

## 2019-12-30 DIAGNOSIS — R27.8 DECREASED COORDINATION: ICD-10-CM

## 2019-12-30 DIAGNOSIS — G20.A1 IDIOPATHIC PARKINSON'S DISEASE: Primary | ICD-10-CM

## 2019-12-30 DIAGNOSIS — Z74.09 IMPAIRED FUNCTIONAL MOBILITY, BALANCE, GAIT, AND ENDURANCE: ICD-10-CM

## 2019-12-30 DIAGNOSIS — R41.3 MEMORY LOSS: ICD-10-CM

## 2020-03-16 ENCOUNTER — PES CALL (OUTPATIENT)
Dept: ADMINISTRATIVE | Facility: CLINIC | Age: 85
End: 2020-03-16

## 2020-04-16 ENCOUNTER — TELEPHONE (OUTPATIENT)
Dept: NEUROLOGY | Facility: CLINIC | Age: 85
End: 2020-04-16

## 2020-09-28 ENCOUNTER — PES CALL (OUTPATIENT)
Dept: ADMINISTRATIVE | Facility: CLINIC | Age: 85
End: 2020-09-28

## 2020-09-29 ENCOUNTER — PATIENT MESSAGE (OUTPATIENT)
Dept: OTHER | Facility: OTHER | Age: 85
End: 2020-09-29

## 2020-11-04 ENCOUNTER — PES CALL (OUTPATIENT)
Dept: ADMINISTRATIVE | Facility: CLINIC | Age: 85
End: 2020-11-04

## 2020-12-11 ENCOUNTER — PATIENT MESSAGE (OUTPATIENT)
Dept: OTHER | Facility: OTHER | Age: 85
End: 2020-12-11

## 2021-01-04 ENCOUNTER — PATIENT MESSAGE (OUTPATIENT)
Dept: ADMINISTRATIVE | Facility: HOSPITAL | Age: 86
End: 2021-01-04

## 2021-02-09 ENCOUNTER — PES CALL (OUTPATIENT)
Dept: ADMINISTRATIVE | Facility: CLINIC | Age: 86
End: 2021-02-09

## 2021-02-19 ENCOUNTER — PES CALL (OUTPATIENT)
Dept: ADMINISTRATIVE | Facility: CLINIC | Age: 86
End: 2021-02-19

## 2021-02-25 ENCOUNTER — PES CALL (OUTPATIENT)
Dept: ADMINISTRATIVE | Facility: CLINIC | Age: 86
End: 2021-02-25

## 2021-03-29 ENCOUNTER — PES CALL (OUTPATIENT)
Dept: ADMINISTRATIVE | Facility: CLINIC | Age: 86
End: 2021-03-29

## 2021-04-05 ENCOUNTER — PATIENT MESSAGE (OUTPATIENT)
Dept: ADMINISTRATIVE | Facility: HOSPITAL | Age: 86
End: 2021-04-05

## 2021-06-15 NOTE — TELEPHONE ENCOUNTER
Called pt and spoken to pt caregiver and she informed me that she that pt daughter will call back once she makes it back home.   Cigarettes

## 2021-07-07 ENCOUNTER — PATIENT MESSAGE (OUTPATIENT)
Dept: ADMINISTRATIVE | Facility: HOSPITAL | Age: 86
End: 2021-07-07

## 2021-10-04 ENCOUNTER — PATIENT MESSAGE (OUTPATIENT)
Dept: ADMINISTRATIVE | Facility: HOSPITAL | Age: 86
End: 2021-10-04

## 2021-11-11 ENCOUNTER — PES CALL (OUTPATIENT)
Dept: ADMINISTRATIVE | Facility: CLINIC | Age: 86
End: 2021-11-11
Payer: MEDICARE

## 2021-12-16 ENCOUNTER — PATIENT MESSAGE (OUTPATIENT)
Dept: INTERNAL MEDICINE | Facility: CLINIC | Age: 86
End: 2021-12-16
Payer: MEDICARE

## 2021-12-17 ENCOUNTER — TELEPHONE (OUTPATIENT)
Dept: INTERNAL MEDICINE | Facility: CLINIC | Age: 86
End: 2021-12-17
Payer: MEDICARE

## 2021-12-17 DIAGNOSIS — G20.A1 IDIOPATHIC PARKINSON'S DISEASE: Primary | ICD-10-CM

## 2021-12-17 DIAGNOSIS — Z74.09 IMPAIRED FUNCTIONAL MOBILITY, BALANCE, GAIT, AND ENDURANCE: ICD-10-CM

## 2021-12-20 ENCOUNTER — TELEPHONE (OUTPATIENT)
Dept: INTERNAL MEDICINE | Facility: CLINIC | Age: 86
End: 2021-12-20
Payer: MEDICARE

## 2021-12-20 ENCOUNTER — PATIENT MESSAGE (OUTPATIENT)
Dept: INTERNAL MEDICINE | Facility: CLINIC | Age: 86
End: 2021-12-20
Payer: MEDICARE

## 2022-01-26 ENCOUNTER — PATIENT MESSAGE (OUTPATIENT)
Dept: ADMINISTRATIVE | Facility: HOSPITAL | Age: 87
End: 2022-01-26
Payer: MEDICARE

## 2022-03-16 ENCOUNTER — PATIENT MESSAGE (OUTPATIENT)
Dept: ADMINISTRATIVE | Facility: HOSPITAL | Age: 87
End: 2022-03-16
Payer: MEDICARE

## 2022-06-03 ENCOUNTER — PATIENT MESSAGE (OUTPATIENT)
Dept: NEUROLOGY | Facility: CLINIC | Age: 87
End: 2022-06-03
Payer: MEDICARE

## 2022-10-26 ENCOUNTER — PES CALL (OUTPATIENT)
Dept: ADMINISTRATIVE | Facility: OTHER | Age: 87
End: 2022-10-26
Payer: MEDICARE

## 2022-12-08 ENCOUNTER — PES CALL (OUTPATIENT)
Dept: ADMINISTRATIVE | Facility: CLINIC | Age: 87
End: 2022-12-08
Payer: MEDICARE

## 2023-02-09 DIAGNOSIS — Z00.00 ENCOUNTER FOR MEDICARE ANNUAL WELLNESS EXAM: ICD-10-CM

## 2023-06-09 ENCOUNTER — PES CALL (OUTPATIENT)
Dept: ADMINISTRATIVE | Facility: CLINIC | Age: 88
End: 2023-06-09
Payer: MEDICARE

## 2023-08-23 ENCOUNTER — PES CALL (OUTPATIENT)
Dept: ADMINISTRATIVE | Facility: CLINIC | Age: 88
End: 2023-08-23
Payer: MEDICARE
